# Patient Record
Sex: FEMALE | Race: WHITE | NOT HISPANIC OR LATINO | Employment: OTHER | URBAN - METROPOLITAN AREA
[De-identification: names, ages, dates, MRNs, and addresses within clinical notes are randomized per-mention and may not be internally consistent; named-entity substitution may affect disease eponyms.]

---

## 2023-01-20 ENCOUNTER — APPOINTMENT (OUTPATIENT)
Dept: MRI IMAGING | Facility: CLINIC | Age: 83
End: 2023-01-20
Attending: EMERGENCY MEDICINE
Payer: MEDICARE

## 2023-01-20 ENCOUNTER — HOSPITAL ENCOUNTER (EMERGENCY)
Facility: CLINIC | Age: 83
Discharge: HOME | End: 2023-01-20
Attending: EMERGENCY MEDICINE | Admitting: EMERGENCY MEDICINE
Payer: MEDICARE

## 2023-01-20 VITALS
RESPIRATION RATE: 20 BRPM | OXYGEN SATURATION: 96 % | HEART RATE: 68 BPM | WEIGHT: 130 LBS | TEMPERATURE: 97 F | DIASTOLIC BLOOD PRESSURE: 72 MMHG | SYSTOLIC BLOOD PRESSURE: 114 MMHG

## 2023-01-20 DIAGNOSIS — R51.9 NONINTRACTABLE EPISODIC HEADACHE, UNSPECIFIED HEADACHE TYPE: Primary | ICD-10-CM

## 2023-01-20 DIAGNOSIS — H53.8 BLURRED VISION: ICD-10-CM

## 2023-01-20 LAB
ALBUMIN SERPL BCG-MCNC: 4 G/DL (ref 3.5–5.2)
ALP SERPL-CCNC: 34 U/L (ref 35–104)
ALT SERPL W P-5'-P-CCNC: 48 U/L (ref 10–35)
ANION GAP SERPL CALCULATED.3IONS-SCNC: 8 MMOL/L (ref 7–15)
AST SERPL W P-5'-P-CCNC: 39 U/L (ref 10–35)
BASOPHILS # BLD AUTO: 0 10E3/UL (ref 0–0.2)
BASOPHILS NFR BLD AUTO: 0 %
BILIRUB SERPL-MCNC: 0.4 MG/DL
BUN SERPL-MCNC: 14.2 MG/DL (ref 8–23)
CALCIUM SERPL-MCNC: 9.3 MG/DL (ref 8.8–10.2)
CHLORIDE SERPL-SCNC: 97 MMOL/L (ref 98–107)
CREAT SERPL-MCNC: 0.92 MG/DL (ref 0.51–0.95)
DEPRECATED HCO3 PLAS-SCNC: 28 MMOL/L (ref 22–29)
EOSINOPHIL # BLD AUTO: 0.1 10E3/UL (ref 0–0.7)
EOSINOPHIL NFR BLD AUTO: 3 %
ERYTHROCYTE [DISTWIDTH] IN BLOOD BY AUTOMATED COUNT: 12.8 % (ref 10–15)
ERYTHROCYTE [SEDIMENTATION RATE] IN BLOOD BY WESTERGREN METHOD: 32 MM/HR (ref 0–30)
GFR SERPL CREATININE-BSD FRML MDRD: 62 ML/MIN/1.73M2
GLUCOSE SERPL-MCNC: 125 MG/DL (ref 70–99)
HCT VFR BLD AUTO: 42.4 % (ref 35–47)
HGB BLD-MCNC: 14.2 G/DL (ref 11.7–15.7)
IMM GRANULOCYTES # BLD: 0 10E3/UL
IMM GRANULOCYTES NFR BLD: 0 %
LYMPHOCYTES # BLD AUTO: 1.7 10E3/UL (ref 0.8–5.3)
LYMPHOCYTES NFR BLD AUTO: 37 %
MCH RBC QN AUTO: 29.9 PG (ref 26.5–33)
MCHC RBC AUTO-ENTMCNC: 33.5 G/DL (ref 31.5–36.5)
MCV RBC AUTO: 89 FL (ref 78–100)
MONOCYTES # BLD AUTO: 0.5 10E3/UL (ref 0–1.3)
MONOCYTES NFR BLD AUTO: 11 %
NEUTROPHILS # BLD AUTO: 2.3 10E3/UL (ref 1.6–8.3)
NEUTROPHILS NFR BLD AUTO: 49 %
NRBC # BLD AUTO: 0 10E3/UL
NRBC BLD AUTO-RTO: 0 /100
PLATELET # BLD AUTO: 309 10E3/UL (ref 150–450)
POTASSIUM SERPL-SCNC: 4.5 MMOL/L (ref 3.4–5.3)
PROT SERPL-MCNC: 7.2 G/DL (ref 6.4–8.3)
RBC # BLD AUTO: 4.75 10E6/UL (ref 3.8–5.2)
SODIUM SERPL-SCNC: 133 MMOL/L (ref 136–145)
WBC # BLD AUTO: 4.7 10E3/UL (ref 4–11)

## 2023-01-20 PROCEDURE — 85652 RBC SED RATE AUTOMATED: CPT | Performed by: EMERGENCY MEDICINE

## 2023-01-20 PROCEDURE — 255N000002 HC RX 255 OP 636: Performed by: EMERGENCY MEDICINE

## 2023-01-20 PROCEDURE — 99285 EMERGENCY DEPT VISIT HI MDM: CPT | Mod: 25

## 2023-01-20 PROCEDURE — 250N000011 HC RX IP 250 OP 636: Performed by: EMERGENCY MEDICINE

## 2023-01-20 PROCEDURE — A9585 GADOBUTROL INJECTION: HCPCS | Performed by: EMERGENCY MEDICINE

## 2023-01-20 PROCEDURE — 96375 TX/PRO/DX INJ NEW DRUG ADDON: CPT | Mod: 59

## 2023-01-20 PROCEDURE — 85025 COMPLETE CBC W/AUTO DIFF WBC: CPT | Performed by: EMERGENCY MEDICINE

## 2023-01-20 PROCEDURE — 36415 COLL VENOUS BLD VENIPUNCTURE: CPT | Performed by: EMERGENCY MEDICINE

## 2023-01-20 PROCEDURE — 80053 COMPREHEN METABOLIC PANEL: CPT | Performed by: EMERGENCY MEDICINE

## 2023-01-20 PROCEDURE — 96374 THER/PROPH/DIAG INJ IV PUSH: CPT | Mod: 59

## 2023-01-20 PROCEDURE — 70553 MRI BRAIN STEM W/O & W/DYE: CPT

## 2023-01-20 RX ORDER — DIPHENHYDRAMINE HYDROCHLORIDE 50 MG/ML
25 INJECTION INTRAMUSCULAR; INTRAVENOUS ONCE
Status: COMPLETED | OUTPATIENT
Start: 2023-01-20 | End: 2023-01-20

## 2023-01-20 RX ORDER — GADOBUTROL 604.72 MG/ML
6 INJECTION INTRAVENOUS ONCE
Status: COMPLETED | OUTPATIENT
Start: 2023-01-20 | End: 2023-01-20

## 2023-01-20 RX ADMIN — GADOBUTROL 6 ML: 604.72 INJECTION INTRAVENOUS at 18:55

## 2023-01-20 RX ADMIN — DIPHENHYDRAMINE HYDROCHLORIDE 25 MG: 50 INJECTION, SOLUTION INTRAMUSCULAR; INTRAVENOUS at 15:57

## 2023-01-20 RX ADMIN — PROCHLORPERAZINE EDISYLATE 5 MG: 5 INJECTION INTRAMUSCULAR; INTRAVENOUS at 15:57

## 2023-01-20 ASSESSMENT — VISUAL ACUITY
OD: 20/60
OS: 20/60

## 2023-01-20 ASSESSMENT — ENCOUNTER SYMPTOMS
HEADACHES: 1
SHORTNESS OF BREATH: 0
LIGHT-HEADEDNESS: 1

## 2023-01-20 NOTE — ED TRIAGE NOTES
Patient reports intermittent sharp headache since Wednesday.   She also reports today she had some flashing in her vision.  She currently is headache free and she does not have any vision changes.  She did not have any numbness or tingling anywhere during these episodes or now.  No arm drift.  Sensation equal.  Shes currently symptom free but does complain of feeing off.  She took a xanax earlier and since feels better.

## 2023-01-20 NOTE — ED NOTES
"Rapid Assessment Note    History:   Bailey Monroe is a 82 year old female who presents with headache and visual disturbances. The patient reports that two days ago she felt a sharp, intermittent, and strobing headache that keeps her from sleeping. She explains that today her peripheral vision was affected, and she began seeing strobing lights in both eyes that last about 20 minutes. Her vision was also blurry at this time. Bailey is currently concerned that these symptoms may be due to a stroke. She says that she took a Xanax this morning but no other medications. Of note, for the past 10 days she has exhibited the feeling of a lowered blood pressure, shakiness, and lightheadedness. Bailey suspects that she may have a sinus issue or low blood sugar, explaining that she has felt \"off\" in this time period. She has never had sharp headaches in the past.    Exam:   General:  Alert, interactive  Cardiovascular:  Well perfused  Lungs:  No respiratory distress, no accessory muscle use  Neuro:  Moving all 4 extremities  Skin:  Warm, dry  Psych:  Normal affect    Plan of Care:   I evaluated the patient and developed an initial plan of care. I discussed this plan and explained that I, or one of my partners, would be returning to complete the evaluation.         I, Jp Kirill, am serving as a scribe to document services personally performed by Bradly Freedman MD, based on my observations and the provider's statements to me.    1/20/2023  [facility]    Portions of this medical record were completed by a scribe. UPON MY REVIEW AND AUTHENTICATION BY ELECTRONIC SIGNATURE, this confirms (a) I performed the applicable clinical services, and (b) the record is accurate.        Bradly Freedman MD  01/20/23 9799    "

## 2023-01-21 NOTE — ED PROVIDER NOTES
History   Chief Complaint:  Headache     HPI   Bailey Monroe is a 82 year old female who presents with a constant, stabbing headache that started 2 days ago. It had gotten better today but while she was looking at her computer she developed bright flashes in her vision with intermittent and milder headaches. She took Aleve with mild relief. She does not have a history of headaches. She has lightheadedness and no headache currently. She does not have a history of hypertension but her blood pressure as maggie today. She denies chest pain or shortness of breath.     Independent Historian: the patient    ROS:  Review of Systems   Eyes: Positive for visual disturbance.   Respiratory: Negative for shortness of breath.    Cardiovascular: Negative for chest pain.   Neurological: Positive for light-headedness and headaches.   All other systems reviewed and are negative.    Allergies:  Bacitracin    Medications:    Albuterol  Flonase  Desyrel    Past Medical History:    Asthma  Osteoarthritis  Anxiety  Hyperlipidemia  Diverticulosis  ADD  Hypothyroidism  Depression    Past Surgical History:    Breast reconstruction  Laminectomy  Tonsillectomy   Liposuction  Breast augmentation  Mastopexy    Social History:  She presents with her son  PCP: Harriet Petersen     Physical Exam     Patient Vitals for the past 24 hrs:   BP Temp Temp src Pulse Resp SpO2 Weight   01/20/23 1600 137/79 -- -- -- -- -- 59 kg (130 lb)   01/20/23 1508 (!) 157/87 97  F (36.1  C) Temporal 104 20 96 % --      Physical Exam  General: Alert, appears well-developed and well-nourished. Cooperative.     In no distress  HEENT:  Head:  Atraumatic  Ears:  External ears are normal  Mouth/Throat:  Oropharynx is without erythema or exudate and mucous membranes are moist.   Eyes:   Conjunctivae normal and EOM are normal. No scleral icterus.    Pupils are equal, round, and reactive to light.   CV:  Normal rate, regular rhythm, normal heart sounds and radial pulses  are 2+ and symmetric.  No murmur.  Resp:  Breath sounds are clear bilaterally    Non-labored, no retractions or accessory muscle use  GI:  Abdomen is soft, no distension, no tenderness. No rebound or guarding.  No CVA tenderness bilaterally  MS:  Normal range of motion. No edema.    Normal strength in all 4 extremities.     Back atraumatic.    No midline cervical, thoracic, or lumbar tenderness  Skin:  Warm and dry.  No rash or lesions noted.  Neuro: Alert. Normal strength.  Sensation intact in all 4 extremities. GCS: 15    Cranial nerves 2-12 intact.  Psych:  Normal mood and affect.    Emergency Department Course   Imaging:  MR Brain w/o & w Contrast   Final Result   IMPRESSION:   1.  No acute intracranial process.   2.  Generalized brain atrophy and presumed microvascular ischemic changes as detailed above.      Report per radiology    Laboratory:  Labs Ordered and Resulted from Time of ED Arrival to Time of ED Departure   COMPREHENSIVE METABOLIC PANEL - Abnormal       Result Value    Sodium 133 (*)     Potassium 4.5      Chloride 97 (*)     Carbon Dioxide (CO2) 28      Anion Gap 8      Urea Nitrogen 14.2      Creatinine 0.92      Calcium 9.3      Glucose 125 (*)     Alkaline Phosphatase 34 (*)     AST 39 (*)     ALT 48 (*)     Protein Total 7.2      Albumin 4.0      Bilirubin Total 0.4      GFR Estimate 62     ERYTHROCYTE SEDIMENTATION RATE AUTO - Abnormal    Erythrocyte Sedimentation Rate 32 (*)    CBC WITH PLATELETS AND DIFFERENTIAL    WBC Count 4.7      RBC Count 4.75      Hemoglobin 14.2      Hematocrit 42.4      MCV 89      MCH 29.9      MCHC 33.5      RDW 12.8      Platelet Count 309      % Neutrophils 49      % Lymphocytes 37      % Monocytes 11      % Eosinophils 3      % Basophils 0      % Immature Granulocytes 0      NRBCs per 100 WBC 0      Absolute Neutrophils 2.3      Absolute Lymphocytes 1.7      Absolute Monocytes 0.5      Absolute Eosinophils 0.1      Absolute Basophils 0.0      Absolute Immature  Granulocytes 0.0      Absolute NRBCs 0.0        Emergency Department Course & Assessments:     2025 I obtained a history and examined the patient   2035 I explained findings and she is comfortable with discharge    Interventions:  Medications   prochlorperazine (COMPAZINE) injection 5 mg (5 mg Intravenous Given 1/20/23 1557)   diphenhydrAMINE (BENADRYL) injection 25 mg (25 mg Intravenous Given 1/20/23 1557)   gadobutrol (GADAVIST) injection 6 mL (6 mLs Intravenous Given 1/20/23 1855)      Independent Interpretation (X-rays, CTs, rhythm strip):  none     Disposition:  The patient was discharged to home.     Impression & Plan      Medical Decision Making:  Bailey Monroe is a 82 year old female who presents with an intermittent headache for three days and blurred vision. She does not have a history of headaches.  I considered a broad differential diagnosis for this patient including tension, migraine, analgesic rebound, occipital neuralgia, etc.  I also considered other less common but serious causes considered included meningitis, encephalitis, subarachnoid bleed, temporal arteritis, stroke, tumor, etc.  She has no signs of serious headache etiologies at this point.  Given ongoing blurred vision with intermittent headaches we did obtain MRI imaging of the brain which reassuringly showed no evidence of stroke, intracranial hemorrhage, intracranial tumor or masses.  Her symptoms are improved after interventions provided in the emergency department.  Lumbar puncture & additional neuroimaging is not indicated at this time is very low concern for SAH.  Her symptoms could be related to an atypical migraine although she has not been diagnosed formally with migraines historically.  I did encourage her to follow-up closely with her primary care provider and should her headaches continue in an intermittent fashion she may require referral to neurology for further outpatient work-up.  Her questions were answered and she  feels improved after above interventions in ED.  Supportive outpatient management is therefore indicated.  Headache precautions given for home.      Diagnosis:    ICD-10-CM    1. Nonintractable episodic headache, unspecified headache type  R51.9       2. Blurred vision  H53.8           Scribe Disclosure:  I, Pamela Corbett, am serving as a scribe at 8:25 PM on 1/20/2023 to document services personally performed by Everardo Cruz MD based on my observations and the provider's statements to me.    1/20/2023   Everardo Cruz MD White, Scott, MD  01/21/23 0145

## 2024-01-01 ENCOUNTER — HOSPITAL ENCOUNTER (EMERGENCY)
Facility: CLINIC | Age: 84
Discharge: HOME | End: 2024-01-02
Attending: EMERGENCY MEDICINE | Admitting: EMERGENCY MEDICINE
Payer: MEDICARE

## 2024-01-01 DIAGNOSIS — R07.9 CHEST PAIN, UNSPECIFIED TYPE: ICD-10-CM

## 2024-01-01 LAB
ANION GAP SERPL CALCULATED.3IONS-SCNC: 8 MMOL/L (ref 7–15)
ATRIAL RATE - MUSE: 72 BPM
BASOPHILS # BLD AUTO: 0 10E3/UL (ref 0–0.2)
BASOPHILS NFR BLD AUTO: 1 %
BUN SERPL-MCNC: 13.9 MG/DL (ref 8–23)
CALCIUM SERPL-MCNC: 8.4 MG/DL (ref 8.8–10.2)
CHLORIDE SERPL-SCNC: 100 MMOL/L (ref 98–107)
CREAT SERPL-MCNC: 0.81 MG/DL (ref 0.51–0.95)
D DIMER PPP FEU-MCNC: 0.85 UG/ML FEU (ref 0–0.5)
DEPRECATED HCO3 PLAS-SCNC: 27 MMOL/L (ref 22–29)
DIASTOLIC BLOOD PRESSURE - MUSE: NORMAL MMHG
EGFRCR SERPLBLD CKD-EPI 2021: 72 ML/MIN/1.73M2
EOSINOPHIL # BLD AUTO: 0.1 10E3/UL (ref 0–0.7)
EOSINOPHIL NFR BLD AUTO: 2 %
ERYTHROCYTE [DISTWIDTH] IN BLOOD BY AUTOMATED COUNT: 12.3 % (ref 10–15)
GLUCOSE SERPL-MCNC: 121 MG/DL (ref 70–99)
HCT VFR BLD AUTO: 36.6 % (ref 35–47)
HGB BLD-MCNC: 12.6 G/DL (ref 11.7–15.7)
IMM GRANULOCYTES # BLD: 0 10E3/UL
IMM GRANULOCYTES NFR BLD: 0 %
INTERPRETATION ECG - MUSE: NORMAL
LYMPHOCYTES # BLD AUTO: 1.9 10E3/UL (ref 0.8–5.3)
LYMPHOCYTES NFR BLD AUTO: 36 %
MCH RBC QN AUTO: 31 PG (ref 26.5–33)
MCHC RBC AUTO-ENTMCNC: 34.4 G/DL (ref 31.5–36.5)
MCV RBC AUTO: 90 FL (ref 78–100)
MONOCYTES # BLD AUTO: 0.5 10E3/UL (ref 0–1.3)
MONOCYTES NFR BLD AUTO: 10 %
NEUTROPHILS # BLD AUTO: 2.6 10E3/UL (ref 1.6–8.3)
NEUTROPHILS NFR BLD AUTO: 51 %
NRBC # BLD AUTO: 0 10E3/UL
NRBC BLD AUTO-RTO: 0 /100
NT-PROBNP SERPL-MCNC: 169 PG/ML (ref 0–1800)
P AXIS - MUSE: 52 DEGREES
PLATELET # BLD AUTO: 270 10E3/UL (ref 150–450)
POTASSIUM SERPL-SCNC: 3.6 MMOL/L (ref 3.4–5.3)
PR INTERVAL - MUSE: 180 MS
QRS DURATION - MUSE: 86 MS
QT - MUSE: 402 MS
QTC - MUSE: 440 MS
R AXIS - MUSE: -39 DEGREES
RBC # BLD AUTO: 4.07 10E6/UL (ref 3.8–5.2)
SODIUM SERPL-SCNC: 135 MMOL/L (ref 135–145)
SYSTOLIC BLOOD PRESSURE - MUSE: NORMAL MMHG
T AXIS - MUSE: 55 DEGREES
TROPONIN T SERPL HS-MCNC: 9 NG/L
VENTRICULAR RATE- MUSE: 72 BPM
WBC # BLD AUTO: 5.2 10E3/UL (ref 4–11)

## 2024-01-01 PROCEDURE — 99285 EMERGENCY DEPT VISIT HI MDM: CPT | Mod: 25

## 2024-01-01 PROCEDURE — 85025 COMPLETE CBC W/AUTO DIFF WBC: CPT | Performed by: EMERGENCY MEDICINE

## 2024-01-01 PROCEDURE — 36415 COLL VENOUS BLD VENIPUNCTURE: CPT | Performed by: EMERGENCY MEDICINE

## 2024-01-01 PROCEDURE — 80048 BASIC METABOLIC PNL TOTAL CA: CPT | Performed by: EMERGENCY MEDICINE

## 2024-01-01 PROCEDURE — 93005 ELECTROCARDIOGRAM TRACING: CPT

## 2024-01-01 PROCEDURE — 84484 ASSAY OF TROPONIN QUANT: CPT | Performed by: EMERGENCY MEDICINE

## 2024-01-01 PROCEDURE — 85379 FIBRIN DEGRADATION QUANT: CPT | Performed by: EMERGENCY MEDICINE

## 2024-01-01 PROCEDURE — 83880 ASSAY OF NATRIURETIC PEPTIDE: CPT | Performed by: EMERGENCY MEDICINE

## 2024-01-01 ASSESSMENT — ACTIVITIES OF DAILY LIVING (ADL): ADLS_ACUITY_SCORE: 35

## 2024-01-02 ENCOUNTER — APPOINTMENT (OUTPATIENT)
Dept: CT IMAGING | Facility: CLINIC | Age: 84
End: 2024-01-02
Attending: EMERGENCY MEDICINE
Payer: MEDICARE

## 2024-01-02 VITALS
DIASTOLIC BLOOD PRESSURE: 77 MMHG | RESPIRATION RATE: 15 BRPM | HEART RATE: 70 BPM | SYSTOLIC BLOOD PRESSURE: 114 MMHG | OXYGEN SATURATION: 94 % | TEMPERATURE: 97.8 F

## 2024-01-02 LAB — TROPONIN T SERPL HS-MCNC: <6 NG/L

## 2024-01-02 PROCEDURE — 36415 COLL VENOUS BLD VENIPUNCTURE: CPT | Performed by: EMERGENCY MEDICINE

## 2024-01-02 PROCEDURE — 71275 CT ANGIOGRAPHY CHEST: CPT | Mod: MA

## 2024-01-02 PROCEDURE — 250N000009 HC RX 250: Performed by: EMERGENCY MEDICINE

## 2024-01-02 PROCEDURE — 84484 ASSAY OF TROPONIN QUANT: CPT | Performed by: EMERGENCY MEDICINE

## 2024-01-02 PROCEDURE — 250N000011 HC RX IP 250 OP 636: Performed by: EMERGENCY MEDICINE

## 2024-01-02 RX ORDER — IOPAMIDOL 755 MG/ML
57 INJECTION, SOLUTION INTRAVASCULAR ONCE
Status: COMPLETED | OUTPATIENT
Start: 2024-01-02 | End: 2024-01-02

## 2024-01-02 RX ADMIN — IOPAMIDOL 57 ML: 755 INJECTION, SOLUTION INTRAVENOUS at 01:19

## 2024-01-02 RX ADMIN — SODIUM CHLORIDE 84 ML: 9 INJECTION, SOLUTION INTRAVENOUS at 01:19

## 2024-01-02 ASSESSMENT — ACTIVITIES OF DAILY LIVING (ADL): ADLS_ACUITY_SCORE: 35

## 2024-01-02 NOTE — ED PROVIDER NOTES
"  History     Chief Complaint:  Chest Pain    HPI   Bailey Monroe is a 83 year old female who presents with chest pain beginning around 4 hours ago. She reports that she was sitting down when she began noticing a \"stabbing\" pain in the left side that lasted around 30 seconds. Reports that the pain alleviated; when she stood up she took an Aspirin, and when she sat down again she noticed the pain returned but was less severe. Endorses lightheadedness and some nausea. Also endorses shortness of breath which she believes may be due to \"panicking\"; she notes she was \"afraid to breathe too deeply\". No leg swelling, fever, cough, or cold. No history of heart attack or blood clot. She is not anticoagulated.       Independent Historian:   None - Patient Only    Review of External Notes:          Medications:    Guaifenesin   Methylprednisolone     Past Medical History:    Anxiety  Hyperlipidemia   Stage 3a chronic kidney disease   ADD  Hypothyroidism  Obesity  Depressive disorder     Past Surgical History:    Breast reconstruction  Lumbar laminectomy  Tonsillectomy  Liposuction  Breast augmentation, bilateral  Mastopexy     Physical Exam   Patient Vitals for the past 24 hrs:   BP Temp Temp src Pulse Resp SpO2   01/02/24 0252 114/77 -- -- 70 15 94 %   01/02/24 0200 114/71 -- -- 70 14 93 %   01/02/24 0015 (!) 140/77 -- -- 64 16 93 %   01/01/24 2230 (!) 144/76 -- -- -- 16 95 %   01/01/24 2224 (!) 144/76 97.8  F (36.6  C) Oral 80 16 95 %        Physical Exam  General: Well-nourished, resting comfortably when I enter the room  Eyes: Pupils equal, conjunctivae pink no scleral icterus or conjunctival injection  ENT:  Moist mucus membranes  Respiratory:  Lungs clear to auscultation bilaterally, no crackles/rubs/wheezes.  Good air movement  CV: Normal rate and rhythm, no murmurs  GI:  Abdomen soft and non-distended.  No tenderness, guarding or rebound  Skin: Warm, dry.  No rashes or petechiae  Musculoskeletal: No peripheral " edema or calf tenderness  Neuro: Alert and oriented to person/place/time  Psychiatric: Normal affect    Emergency Department Course   ECG  ECG results from 01/01/24   EKG 12-lead, tracing only     Value    Systolic Blood Pressure     Diastolic Blood Pressure     Ventricular Rate 72    Atrial Rate 72    FL Interval 180    QRS Duration 86        QTc 440    P Axis 52    R AXIS -39    T Axis 55    Interpretation ECG      Sinus rhythm  Left axis deviation  Pulmonary disease pattern  Nonspecific T wave abnormality  Abnormal ECG  No previous ECGs available  Confirmed by GENERATED REPORT, COMPUTER ([id number]),  DAI SWANSON ([id number]) on 1/1/2024 11:14:11 PM       Imaging:  CT Chest Pulmonary Embolism w Contrast   Final Result   IMPRESSION:   1.  Negative for pulmonary embolism.      2.  No evidence of pneumonia.      3.  Mild coronary artery disease.      NM Lexiscan stress test (nuc card)    (Results Pending)          Laboratory:  Labs Ordered and Resulted from Time of ED Arrival to Time of ED Departure   BASIC METABOLIC PANEL - Abnormal       Result Value    Sodium 135      Potassium 3.6      Chloride 100      Carbon Dioxide (CO2) 27      Anion Gap 8      Urea Nitrogen 13.9      Creatinine 0.81      GFR Estimate 72      Calcium 8.4 (*)     Glucose 121 (*)    D DIMER QUANTITATIVE - Abnormal    D-Dimer Quantitative 0.85 (*)    TROPONIN T, HIGH SENSITIVITY - Normal    Troponin T, High Sensitivity 9     NT PROBNP INPATIENT - Normal    N terminal Pro BNP Inpatient 169     TROPONIN T, HIGH SENSITIVITY - Normal    Troponin T, High Sensitivity <6     CBC WITH PLATELETS AND DIFFERENTIAL    WBC Count 5.2      RBC Count 4.07      Hemoglobin 12.6      Hematocrit 36.6      MCV 90      MCH 31.0      MCHC 34.4      RDW 12.3      Platelet Count 270      % Neutrophils 51      % Lymphocytes 36      % Monocytes 10      % Eosinophils 2      % Basophils 1      % Immature Granulocytes 0      NRBCs per 100 WBC 0      Absolute Neutrophils 2.6       Absolute Lymphocytes 1.9      Absolute Monocytes 0.5      Absolute Eosinophils 0.1      Absolute Basophils 0.0      Absolute Immature Granulocytes 0.0      Absolute NRBCs 0.0          Procedures       Emergency Department Course & Assessments:       Interventions:  Medications   iopamidol (ISOVUE-370) solution 57 mL (57 mLs Intravenous $Given 24)   Saline (84 mLs Intravenous $Given 24)        Assessments:   I entered the patient's room and obtained history.   I rechecked the patient and explained findings.    Independent Interpretation (X-rays, CTs, rhythm strip):  None    Consultations/Discussion of Management or Tests:  None        Social Determinants of Health affecting care:   None    Disposition:  The patient was discharged to home.     Impression & Plan    CMS Diagnoses: None    Medical Decision Makin-year-old female presents emergency department with a complaint of chest pain.  Patient reports about 4 hours ago she had an episode of 30 seconds of sharp chest pain on the left side.  She states that it was stabbing.  She reports that it resolved.  She reports during that episode she felt a little bit lightheaded and had some nausea.  She states that she felt like she was panicking.  On exam patient's pain has resolved.  She is not in any acute distress.  She is not hypoxic, tachycardic, or tachypneic.  Lab work is reassuring.  D-dimer is elevated, even with age adjustment it is still a little bit high.  I did speak with the patient about risks and benefits of CT scan.  She does want to go ahead with the CT scan.  The CT scan does not show any PE.  The report does say mild coronary artery disease.  Patient's repeat troponin is completely negative.  No signs of heart failure.  Patient is not having any swelling in her legs.  No signs of pneumonia.  She has not had any fevers, cough, or congestion.  I will set up a stress test for the patient as well as follow-up with  cardiology.  Patient feels comfortable with this plan.  She is given strict return precautions.  Patient is discharged home.      Diagnosis:    ICD-10-CM    1. Chest pain, unspecified type  R07.9 Follow-Up with Cardiology     NM Lexiscan stress test (nuc card)           Discharge Medications:  There are no discharge medications for this patient.         Scribe Disclosure:  Leigh Ann NEGRON Hired, am serving as a scribe at 11:06 PM on 1/1/2024 to document services personally performed by Samara Manzo MD based on my observations and the provider's statements to me.   1/1/2024   Samara Manzo MD Richardson, Elizabeth, MD  01/02/24 0422

## 2024-01-02 NOTE — ED TRIAGE NOTES
Arrived via ems for c/o chest pain for 3 times that lasted 30 seconds around 7 pm per ems. Pain rated 9/10. Patient feel lightheadedness and nausea after the chest pain. Patient has h/o asthma. Aspirin 162 mg given ems.  per ems. Patient also stated she took aspirin at home. Patient is alert and oriented x4. Denies sob and chest pain right now.        Triage Assessment (Adult)       Row Name 01/01/24 9327          Triage Assessment    Airway WDL WDL        Respiratory WDL    Respiratory WDL WDL        Skin Circulation/Temperature WDL    Skin Circulation/Temperature WDL WDL        Cardiac WDL    Cardiac WDL WDL        Peripheral/Neurovascular WDL    Peripheral Neurovascular WDL WDL        Cognitive/Neuro/Behavioral WDL    Cognitive/Neuro/Behavioral WDL WDL

## 2024-01-04 ENCOUNTER — HOSPITAL ENCOUNTER (OUTPATIENT)
Dept: CARDIOLOGY | Facility: CLINIC | Age: 84
Discharge: HOME | End: 2024-01-04
Attending: EMERGENCY MEDICINE
Payer: MEDICARE

## 2024-01-04 VITALS
BODY MASS INDEX: 24.13 KG/M2 | DIASTOLIC BLOOD PRESSURE: 64 MMHG | HEIGHT: 63 IN | SYSTOLIC BLOOD PRESSURE: 102 MMHG | OXYGEN SATURATION: 97 % | WEIGHT: 136.2 LBS

## 2024-01-04 VITALS — SYSTOLIC BLOOD PRESSURE: 122 MMHG | DIASTOLIC BLOOD PRESSURE: 66 MMHG | HEART RATE: 78 BPM

## 2024-01-04 DIAGNOSIS — R07.9 CHEST PAIN, UNSPECIFIED TYPE: ICD-10-CM

## 2024-01-04 LAB
CV STRESS MAX HR HE: 118
RATE PRESSURE PRODUCT: NORMAL
STRESS ECHO BASELINE DIASTOLIC HE: 66
STRESS ECHO BASELINE HR: 76 BPM
STRESS ECHO BASELINE SYSTOLIC BP: 122
STRESS ECHO CALCULATED PERCENT HR: 86 %
STRESS ECHO LAST STRESS DIASTOLIC BP: 62
STRESS ECHO LAST STRESS SYSTOLIC BP: 120
STRESS ECHO TARGET HR: 138

## 2024-01-04 PROCEDURE — 93016 CV STRESS TEST SUPVJ ONLY: CPT | Performed by: INTERNAL MEDICINE

## 2024-01-04 PROCEDURE — 343N000001 HC RX 343: Performed by: EMERGENCY MEDICINE

## 2024-01-04 PROCEDURE — A9502 TC99M TETROFOSMIN: HCPCS | Performed by: EMERGENCY MEDICINE

## 2024-01-04 PROCEDURE — 78452 HT MUSCLE IMAGE SPECT MULT: CPT | Mod: ME

## 2024-01-04 PROCEDURE — 250N000011 HC RX IP 250 OP 636: Performed by: EMERGENCY MEDICINE

## 2024-01-04 PROCEDURE — 93018 CV STRESS TEST I&R ONLY: CPT | Performed by: INTERNAL MEDICINE

## 2024-01-04 PROCEDURE — 78452 HT MUSCLE IMAGE SPECT MULT: CPT | Mod: 26 | Performed by: INTERNAL MEDICINE

## 2024-01-04 PROCEDURE — G1010 CDSM STANSON: HCPCS | Performed by: INTERNAL MEDICINE

## 2024-01-04 RX ORDER — CAFFEINE CITRATE 20 MG/ML
60 SOLUTION INTRAVENOUS
Status: DISCONTINUED | OUTPATIENT
Start: 2024-01-04 | End: 2024-01-05 | Stop reason: HOSPADM

## 2024-01-04 RX ORDER — AMINOPHYLLINE 25 MG/ML
50-100 INJECTION, SOLUTION INTRAVENOUS
Status: DISCONTINUED | OUTPATIENT
Start: 2024-01-04 | End: 2024-01-05 | Stop reason: HOSPADM

## 2024-01-04 RX ORDER — REGADENOSON 0.08 MG/ML
0.4 INJECTION, SOLUTION INTRAVENOUS ONCE
Status: COMPLETED | OUTPATIENT
Start: 2024-01-04 | End: 2024-01-04

## 2024-01-04 RX ORDER — ALBUTEROL SULFATE 90 UG/1
2 AEROSOL, METERED RESPIRATORY (INHALATION) EVERY 5 MIN PRN
Status: DISCONTINUED | OUTPATIENT
Start: 2024-01-04 | End: 2024-01-05 | Stop reason: HOSPADM

## 2024-01-04 RX ORDER — ACYCLOVIR 200 MG/1
0-1 CAPSULE ORAL
Status: DISCONTINUED | OUTPATIENT
Start: 2024-01-04 | End: 2024-01-05 | Stop reason: HOSPADM

## 2024-01-04 RX ADMIN — TETROFOSMIN 3.4 MILLICURIE: 1.38 INJECTION, POWDER, LYOPHILIZED, FOR SOLUTION INTRAVENOUS at 12:57

## 2024-01-04 RX ADMIN — REGADENOSON 0.4 MG: 0.08 INJECTION, SOLUTION INTRAVENOUS at 14:05

## 2024-01-04 RX ADMIN — TETROFOSMIN 9.99 MILLICURIE: 1.38 INJECTION, POWDER, LYOPHILIZED, FOR SOLUTION INTRAVENOUS at 14:09

## 2024-01-14 ENCOUNTER — HEALTH MAINTENANCE LETTER (OUTPATIENT)
Age: 84
End: 2024-01-14

## 2024-03-01 ENCOUNTER — OFFICE VISIT (OUTPATIENT)
Dept: CARDIOLOGY | Facility: CLINIC | Age: 84
End: 2024-03-01
Attending: EMERGENCY MEDICINE
Payer: MEDICARE

## 2024-03-01 VITALS
OXYGEN SATURATION: 95 % | BODY MASS INDEX: 25.66 KG/M2 | WEIGHT: 135.9 LBS | DIASTOLIC BLOOD PRESSURE: 66 MMHG | SYSTOLIC BLOOD PRESSURE: 132 MMHG | HEART RATE: 79 BPM | HEIGHT: 61 IN

## 2024-03-01 DIAGNOSIS — R07.9 CHEST PAIN, UNSPECIFIED TYPE: ICD-10-CM

## 2024-03-01 DIAGNOSIS — E78.5 DYSLIPIDEMIA: ICD-10-CM

## 2024-03-01 DIAGNOSIS — R01.1 SYSTOLIC MURMUR: Primary | ICD-10-CM

## 2024-03-01 PROCEDURE — 99204 OFFICE O/P NEW MOD 45 MIN: CPT | Performed by: INTERNAL MEDICINE

## 2024-03-01 RX ORDER — ROSUVASTATIN CALCIUM 5 MG/1
5 TABLET, COATED ORAL AT BEDTIME
Qty: 90 TABLET | Refills: 3 | Status: SHIPPED | OUTPATIENT
Start: 2024-03-01 | End: 2024-06-26 | Stop reason: SINTOL

## 2024-03-01 NOTE — PATIENT INSTRUCTIONS
March 1, 2024    Thank you for allowing our Cardiology team to participate in your care.     Please note the following changes to your heart treatment plan:     Medication changes:   - start rosuvastatin 5mg at bedtime     Tests to be done:  - FASTING lipids in 3 months  - TTE (heart ultrasound)    Follow up:  - Follow up in 3 months, or sooner as needed.      For scheduling, please call [phone].      Please contact our team through [employer] or our Nurse Team Voicemail service [phone], or the General Clinic [phone] for any questions or concerns.     If you are having a medical emergency, please call 161.     Sincerely,    Johnnie Mason MD, FACC  Cardiology    [facility] - [facility] - [facility] - [place]

## 2024-03-01 NOTE — PROGRESS NOTES
Cardiology Clinic Consultation:    March 1, 2024   Patient Name: Bailey Monroe  Patient MRN: [id number]    Consult indication: dyslipidemia, chest pain    HPI:    I had the opportunity to see patient Bailey Monroe in cardiology clinic for a consultation.  Patient is followed closely by Dr. Petersen with Primary Care.    As you know patient is a pleasant 83-year-old female with a past medical history significant for dyslipidemia who presents to establish care.    At baseline patient is very physically active.  She has been active for most of her life, and continues to exercise regularly, walking at a brisk pace for about 2 miles a day.  She denies any exertional chest pain, chest pressure, abnormal shortness of breath.  She adheres to a strict diet, avoids red meat, incorporates vegetables, lean protein, fiber.  She does not smoke cigarettes.    She was in her usual state of health until 1/1/2024, she felt acute onset left-sided chest pain lasting for about 30 seconds while she was in bed.  She is seen in the ED, ECG did not demonstrate any clear evidence of ischemia.  CT PE study was negative for PE.  On personal review, I concur there are some mild coronary artery calcifications.  High-sensitivity troponin was normal x 2.  She was assessed with a Lexiscan stress test 1/4/2024.  Study reviewed personally, I concur with the reported findings, negative for inducible myocardial ischemia or infarction.  No recurrence of chest pain since then.    Recent labs notable for total cholesterol 283, triglycerides 240, HDL 61, .  Patient did have some half-and-half with coffee prior to lab draw.    I also cared for her son who has since moved to [place], and I am glad to hear he is doing well.    Assessment and Plan/Recommendations:    # Chest pain, non-cardiac, resolved  # Dyslipidemia, mild CAC on CT PE study  # Soft systolic murmur    -Overall patient is in excellent cardiac health without symptoms  concerning for angina or decompensated heart failure.  She is very physically active, exercising regularly, which is very commendable.  She is also following a generally Mediterranean type diet already.  Encouraged continued healthy lifestyle habits including regular aerobic exercise, heart healthy Mediterranean type diet incorporating olive oil and fiber, lean protein, vegetables  - Reviewed prior cardiac diagnostic studies in detail, discussed options for further evaluation and management  - Discussed risk/benefits, will start low-dose rosuvastatin 5 mg nightly, fasting lipids and ALT in 3 months  - Suspect murmur is benign, likely related to mild dehydration, patient does endorse that she likely does not consume enough fluids throughout the day.  Will assess with a TTE  - Follow-up in 3 months, or sooner as needed    Thank you for allowing our team to participate in the care of Bailey Monroe.  Please do not hesitate to call or page me with any questions or concerns.    Sincerely,     Johnnie Mason MD, [facility]  Cardiology  March 1, 2024      Samara Manzo MD  [facility]  [address]  [address]  [place] [id number]      Past Medical History:     Dyslipidemia      Past Surgical History:   History reviewed. No pertinent surgical history.    Medications (outpatient):  No current outpatient medications on file.       Allergies:  Allergies   Allergen Reactions    Bacitracin Rash    Trazodone Other (See Comments)     Paradoxial reaction, makes her jittery, awake       Social History:   History   Drug Use Not on file      History   Smoking Status    Never   Smokeless Tobacco    Never     Social History    Substance and Sexual Activity      Alcohol use: Yes        Comment: rare       Family History:  Family History   Problem Relation Age of Onset    Anxiety Disorder Mother     Hyperlipidemia Mother     Hypertension Mother     Myocardial Infarction Mother     Goiter Mother   "   Diabetes Paternal Grandmother     Cerebrovascular Disease Paternal Grandfather     Diabetes Brother     Septicemia Brother     Factor V Leiden deficiency Brother        Review of Systems:   A comprehensive 12 system review of systems was carried out.  Pertinent positives and negatives are noted above. Otherwise negative for contributory information.    Objective & Physical Exam:  /66   Pulse 79   Ht 1.556 m (5' 1.25\")   Wt 61.6 kg (135 lb 14.4 oz)   SpO2 95%   BMI 25.47 kg/m    Wt Readings from Last 2 Encounters:   03/01/24 61.6 kg (135 lb 14.4 oz)   01/04/24 61.8 kg (136 lb 3.2 oz)     Body mass index is 25.47 kg/m .   Body surface area is 1.63 meters squared.    Constitutional: appears stated age, in no apparent distress, appears to be well nourished  Pulmonary: clear to auscultation bilaterally, no wheezes, no rales, no increased work of breathing  Cardiovascular: JVP normal, regular rate, regular rhythm, normal S1 and S2, no S3, S4, soft 1/6 BRITTNY at the RUSB, no lower extremity edema    Data reviewed:  Lab Results   Component Value Date    WBC 5.2 01/01/2024    RBC 4.07 01/01/2024    HGB 12.6 01/01/2024    HCT 36.6 01/01/2024    MCV 90 01/01/2024    MCH 31.0 01/01/2024    MCHC 34.4 01/01/2024    RDW 12.3 01/01/2024     01/01/2024     Sodium   Date Value Ref Range Status   01/01/2024 135 135 - 145 mmol/L Final     Comment:     Reference intervals for this test were updated on 09/26/2023 to more accurately reflect our healthy population. There may be differences in the flagging of prior results with similar values performed with this method. Interpretation of those prior results can be made in the context of the updated reference intervals.      Potassium   Date Value Ref Range Status   01/01/2024 3.6 3.4 - 5.3 mmol/L Final     Chloride   Date Value Ref Range Status   01/01/2024 100 98 - 107 mmol/L Final     Carbon Dioxide (CO2)   Date Value Ref Range Status   01/01/2024 27 22 - 29 mmol/L Final " "    Anion Gap   Date Value Ref Range Status   01/01/2024 8 7 - 15 mmol/L Final     Glucose   Date Value Ref Range Status   01/01/2024 121 (H) 70 - 99 mg/dL Final     Urea Nitrogen   Date Value Ref Range Status   01/01/2024 13.9 8.0 - 23.0 mg/dL Final     Creatinine   Date Value Ref Range Status   01/01/2024 0.81 0.51 - 0.95 mg/dL Final     GFR Estimate   Date Value Ref Range Status   01/01/2024 72 >60 mL/min/1.73m2 Final     Calcium   Date Value Ref Range Status   01/01/2024 8.4 (L) 8.8 - 10.2 mg/dL Final     Bilirubin Total   Date Value Ref Range Status   01/20/2023 0.4 <=1.2 mg/dL Final     Alkaline Phosphatase   Date Value Ref Range Status   01/20/2023 34 (L) 35 - 104 U/L Final     ALT   Date Value Ref Range Status   01/20/2023 48 (H) 10 - 35 U/L Final     AST   Date Value Ref Range Status   01/20/2023 39 (H) 10 - 35 U/L Final     No results for input(s): \"CHOL\", \"HDL\", \"LDL\", \"TRIG\", \"CHOLHDLRATIO\" in the last 87962 hours.   No results found for: \"A1C\"     No results found for this or any previous visit (from the past 4320 hour(s)).     "

## 2024-03-01 NOTE — LETTER
3/1/2024    KELSIE PEREZ MD  [address] [place] [address]    RE: Bailey Monroe       Dear Colleague,     I had the pleasure of seeing Bailey Monroe in the [facility].      Cardiology Clinic Consultation:    March 1, 2024   Patient Name: Bailey Monroe  Patient MRN: [id number]    Consult indication: dyslipidemia, chest pain    HPI:    I had the opportunity to see patient Bailey Monroe in cardiology clinic for a consultation.  Patient is followed closely by Dr. Perez with Primary Care.    As you know patient is a pleasant 83-year-old female with a past medical history significant for dyslipidemia who presents to establish care.    At baseline patient is very physically active.  She has been active for most of her life, and continues to exercise regularly, walking at a brisk pace for about 2 miles a day.  She denies any exertional chest pain, chest pressure, abnormal shortness of breath.  She adheres to a strict diet, avoids red meat, incorporates vegetables, lean protein, fiber.  She does not smoke cigarettes.    She was in her usual state of health until 1/1/2024, she felt acute onset left-sided chest pain lasting for about 30 seconds while she was in bed.  She is seen in the ED, ECG did not demonstrate any clear evidence of ischemia.  CT PE study was negative for PE.  On personal review, I concur there are some mild coronary artery calcifications.  High-sensitivity troponin was normal x 2.  She was assessed with a Lexiscan stress test 1/4/2024.  Study reviewed personally, I concur with the reported findings, negative for inducible myocardial ischemia or infarction.  No recurrence of chest pain since then.    Recent labs notable for total cholesterol 283, triglycerides 240, HDL 61, .  Patient did have some half-and-half with coffee prior to lab draw.    I also cared for her son who has since moved to [place], and I am glad to hear he is doing  well.    Assessment and Plan/Recommendations:    # Chest pain, non-cardiac, resolved  # Dyslipidemia, mild CAC on CT PE study  # Soft systolic murmur    -Overall patient is in excellent cardiac health without symptoms concerning for angina or decompensated heart failure.  She is very physically active, exercising regularly, which is very commendable.  She is also following a generally Mediterranean type diet already.  Encouraged continued healthy lifestyle habits including regular aerobic exercise, heart healthy Mediterranean type diet incorporating olive oil and fiber, lean protein, vegetables  - Reviewed prior cardiac diagnostic studies in detail, discussed options for further evaluation and management  - Discussed risk/benefits, will start low-dose rosuvastatin 5 mg nightly, fasting lipids and ALT in 3 months  - Suspect murmur is benign, likely related to mild dehydration, patient does endorse that she likely does not consume enough fluids throughout the day.  Will assess with a TTE  - Follow-up in 3 months, or sooner as needed    Thank you for allowing our team to participate in the care of Bailey Monroe.  Please do not hesitate to call or page me with any questions or concerns.    Sincerely,     Johnnie Mason MD, [facility]  Cardiology  March 1, 2024      Samara Manzo MD  [facility]  [address]  [address]  [place] [address]      Past Medical History:     Dyslipidemia      Past Surgical History:   History reviewed. No pertinent surgical history.    Medications (outpatient):  No current outpatient medications on file.       Allergies:  Allergies   Allergen Reactions    Bacitracin Rash    Trazodone Other (See Comments)     Paradoxial reaction, makes her jittery, awake       Social History:   History   Drug Use Not on file      History   Smoking Status    Never   Smokeless Tobacco    Never     Social History    Substance and Sexual Activity      Alcohol use: Yes      "   Comment: rare       Family History:  Family History   Problem Relation Age of Onset    Anxiety Disorder Mother     Hyperlipidemia Mother     Hypertension Mother     Myocardial Infarction Mother     Goiter Mother     Diabetes Paternal Grandmother     Cerebrovascular Disease Paternal Grandfather     Diabetes Brother     Septicemia Brother     Factor V Leiden deficiency Brother        Review of Systems:   A comprehensive 12 system review of systems was carried out.  Pertinent positives and negatives are noted above. Otherwise negative for contributory information.    Objective & Physical Exam:  /66   Pulse 79   Ht 1.556 m (5' 1.25\")   Wt 61.6 kg (135 lb 14.4 oz)   SpO2 95%   BMI 25.47 kg/m    Wt Readings from Last 2 Encounters:   03/01/24 61.6 kg (135 lb 14.4 oz)   01/04/24 61.8 kg (136 lb 3.2 oz)     Body mass index is 25.47 kg/m .   Body surface area is 1.63 meters squared.    Constitutional: appears stated age, in no apparent distress, appears to be well nourished  Pulmonary: clear to auscultation bilaterally, no wheezes, no rales, no increased work of breathing  Cardiovascular: JVP normal, regular rate, regular rhythm, normal S1 and S2, no S3, S4, soft 1/6 BRITTNY at the RUSB, no lower extremity edema    Data reviewed:  Lab Results   Component Value Date    WBC 5.2 01/01/2024    RBC 4.07 01/01/2024    HGB 12.6 01/01/2024    HCT 36.6 01/01/2024    MCV 90 01/01/2024    MCH 31.0 01/01/2024    MCHC 34.4 01/01/2024    RDW 12.3 01/01/2024     01/01/2024     Sodium   Date Value Ref Range Status   01/01/2024 135 135 - 145 mmol/L Final     Comment:     Reference intervals for this test were updated on 09/26/2023 to more accurately reflect our healthy population. There may be differences in the flagging of prior results with similar values performed with this method. Interpretation of those prior results can be made in the context of the updated reference intervals.      Potassium   Date Value Ref Range " "Status   01/01/2024 3.6 3.4 - 5.3 mmol/L Final     Chloride   Date Value Ref Range Status   01/01/2024 100 98 - 107 mmol/L Final     Carbon Dioxide (CO2)   Date Value Ref Range Status   01/01/2024 27 22 - 29 mmol/L Final     Anion Gap   Date Value Ref Range Status   01/01/2024 8 7 - 15 mmol/L Final     Glucose   Date Value Ref Range Status   01/01/2024 121 (H) 70 - 99 mg/dL Final     Urea Nitrogen   Date Value Ref Range Status   01/01/2024 13.9 8.0 - 23.0 mg/dL Final     Creatinine   Date Value Ref Range Status   01/01/2024 0.81 0.51 - 0.95 mg/dL Final     GFR Estimate   Date Value Ref Range Status   01/01/2024 72 >60 mL/min/1.73m2 Final     Calcium   Date Value Ref Range Status   01/01/2024 8.4 (L) 8.8 - 10.2 mg/dL Final     Bilirubin Total   Date Value Ref Range Status   01/20/2023 0.4 <=1.2 mg/dL Final     Alkaline Phosphatase   Date Value Ref Range Status   01/20/2023 34 (L) 35 - 104 U/L Final     ALT   Date Value Ref Range Status   01/20/2023 48 (H) 10 - 35 U/L Final     AST   Date Value Ref Range Status   01/20/2023 39 (H) 10 - 35 U/L Final     No results for input(s): \"CHOL\", \"HDL\", \"LDL\", \"TRIG\", \"CHOLHDLRATIO\" in the last 44199 hours.   No results found for: \"A1C\"     No results found for this or any previous visit (from the past 4320 hour(s)).       Thank you for allowing me to participate in the care of your patient.      Sincerely,     Johnnie Mason MD     [facility]  cc:   Samara Manzo MD  [facility]  [address]  [place] [address]      "

## 2024-04-04 ENCOUNTER — APPOINTMENT (OUTPATIENT)
Dept: CT IMAGING | Facility: CLINIC | Age: 84
End: 2024-04-04
Attending: STUDENT IN AN ORGANIZED HEALTH CARE EDUCATION/TRAINING PROGRAM
Payer: MEDICARE

## 2024-04-04 ENCOUNTER — HOSPITAL ENCOUNTER (EMERGENCY)
Facility: CLINIC | Age: 84
Discharge: HOME | End: 2024-04-04
Attending: STUDENT IN AN ORGANIZED HEALTH CARE EDUCATION/TRAINING PROGRAM | Admitting: STUDENT IN AN ORGANIZED HEALTH CARE EDUCATION/TRAINING PROGRAM
Payer: MEDICARE

## 2024-04-04 VITALS
RESPIRATION RATE: 16 BRPM | DIASTOLIC BLOOD PRESSURE: 69 MMHG | OXYGEN SATURATION: 97 % | BODY MASS INDEX: 24.66 KG/M2 | TEMPERATURE: 98.4 F | HEIGHT: 62 IN | HEART RATE: 67 BPM | WEIGHT: 134 LBS | SYSTOLIC BLOOD PRESSURE: 130 MMHG
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
## 2024-04-04 DIAGNOSIS — R55 SYNCOPE AND COLLAPSE: Primary | ICD-10-CM

## 2024-04-04 DIAGNOSIS — S09.90XA INJURY OF HEAD, INITIAL ENCOUNTER: ICD-10-CM

## 2024-04-04 DIAGNOSIS — S00.03XA CONTUSION OF SCALP, INITIAL ENCOUNTER: ICD-10-CM
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
## 2024-04-04 LAB
ALBUMIN SERPL BCG-MCNC: 3.9 G/DL (ref 3.5–5.2)
ALP SERPL-CCNC: 32 U/L (ref 40–150)
ALT SERPL W P-5'-P-CCNC: 40 U/L (ref 0–50)
ANION GAP SERPL CALCULATED.3IONS-SCNC: 13 MMOL/L (ref 7–15)
AST SERPL W P-5'-P-CCNC: 48 U/L (ref 0–45)
ATRIAL RATE - MUSE: 74 BPM
BASOPHILS # BLD AUTO: 0 10E3/UL (ref 0–0.2)
BASOPHILS NFR BLD AUTO: 1 %
BILIRUB SERPL-MCNC: 0.3 MG/DL
BUN SERPL-MCNC: 15.6 MG/DL (ref 8–23)
CALCIUM SERPL-MCNC: 8.9 MG/DL (ref 8.8–10.2)
CHLORIDE SERPL-SCNC: 101 MMOL/L (ref 98–107)
CREAT SERPL-MCNC: 0.83 MG/DL (ref 0.51–0.95)
DEPRECATED HCO3 PLAS-SCNC: 24 MMOL/L (ref 22–29)
DIASTOLIC BLOOD PRESSURE - MUSE: NORMAL MMHG
EGFRCR SERPLBLD CKD-EPI 2021: 70 ML/MIN/1.73M2
EOSINOPHIL # BLD AUTO: 0.1 10E3/UL (ref 0–0.7)
EOSINOPHIL NFR BLD AUTO: 1 %
ERYTHROCYTE [DISTWIDTH] IN BLOOD BY AUTOMATED COUNT: 13.2 % (ref 10–15)
GLUCOSE BLDC GLUCOMTR-MCNC: 139 MG/DL (ref 70–99)
GLUCOSE SERPL-MCNC: 146 MG/DL (ref 70–99)
HCT VFR BLD AUTO: 37.9 % (ref 35–47)
HGB BLD-MCNC: 12.8 G/DL (ref 11.7–15.7)
HOLD SPECIMEN: NORMAL
IMM GRANULOCYTES # BLD: 0 10E3/UL
IMM GRANULOCYTES NFR BLD: 0 %
INTERPRETATION ECG - MUSE: NORMAL
LYMPHOCYTES # BLD AUTO: 1.1 10E3/UL (ref 0.8–5.3)
LYMPHOCYTES NFR BLD AUTO: 16 %
MCH RBC QN AUTO: 29.9 PG (ref 26.5–33)
MCHC RBC AUTO-ENTMCNC: 33.8 G/DL (ref 31.5–36.5)
MCV RBC AUTO: 89 FL (ref 78–100)
MONOCYTES # BLD AUTO: 0.7 10E3/UL (ref 0–1.3)
MONOCYTES NFR BLD AUTO: 9 %
NEUTROPHILS # BLD AUTO: 5.1 10E3/UL (ref 1.6–8.3)
NEUTROPHILS NFR BLD AUTO: 73 %
NRBC # BLD AUTO: 0 10E3/UL
NRBC BLD AUTO-RTO: 0 /100
P AXIS - MUSE: 48 DEGREES
PLATELET # BLD AUTO: 266 10E3/UL (ref 150–450)
POTASSIUM SERPL-SCNC: 4.6 MMOL/L (ref 3.4–5.3)
PR INTERVAL - MUSE: 176 MS
PROT SERPL-MCNC: 6.4 G/DL (ref 6.4–8.3)
QRS DURATION - MUSE: 76 MS
QT - MUSE: 384 MS
QTC - MUSE: 426 MS
R AXIS - MUSE: -47 DEGREES
RBC # BLD AUTO: 4.28 10E6/UL (ref 3.8–5.2)
SODIUM SERPL-SCNC: 138 MMOL/L (ref 135–145)
SYSTOLIC BLOOD PRESSURE - MUSE: NORMAL MMHG
T AXIS - MUSE: 55 DEGREES
VENTRICULAR RATE- MUSE: 74 BPM
WBC # BLD AUTO: 7 10E3/UL (ref 4–11)

## 2024-04-04 PROCEDURE — 93005 ELECTROCARDIOGRAM TRACING: CPT

## 2024-04-04 PROCEDURE — 82962 GLUCOSE BLOOD TEST: CPT

## 2024-04-04 PROCEDURE — 72125 CT NECK SPINE W/O DYE: CPT | Mod: MA

## 2024-04-04 PROCEDURE — 36415 COLL VENOUS BLD VENIPUNCTURE: CPT | Performed by: STUDENT IN AN ORGANIZED HEALTH CARE EDUCATION/TRAINING PROGRAM

## 2024-04-04 PROCEDURE — 85025 COMPLETE CBC W/AUTO DIFF WBC: CPT | Performed by: STUDENT IN AN ORGANIZED HEALTH CARE EDUCATION/TRAINING PROGRAM

## 2024-04-04 PROCEDURE — 80053 COMPREHEN METABOLIC PANEL: CPT | Performed by: STUDENT IN AN ORGANIZED HEALTH CARE EDUCATION/TRAINING PROGRAM

## 2024-04-04 PROCEDURE — 70450 CT HEAD/BRAIN W/O DYE: CPT | Mod: MA

## 2024-04-04 PROCEDURE — 99285 EMERGENCY DEPT VISIT HI MDM: CPT | Mod: 25

## 2024-04-04 PROCEDURE — 250N000013 HC RX MED GY IP 250 OP 250 PS 637: Performed by: STUDENT IN AN ORGANIZED HEALTH CARE EDUCATION/TRAINING PROGRAM

## 2024-04-04 RX ORDER — ACETAMINOPHEN 500 MG
1000 TABLET ORAL ONCE
Status: COMPLETED | OUTPATIENT
Start: 2024-04-04 | End: 2024-04-04

## 2024-04-04 RX ADMIN — ACETAMINOPHEN 1000 MG: 500 TABLET, FILM COATED ORAL at 15:50

## 2024-04-04 ASSESSMENT — ACTIVITIES OF DAILY LIVING (ADL)
ADLS_ACUITY_SCORE: 35
ADLS_ACUITY_SCORE: 35

## 2024-04-04 NOTE — ED NOTES
Bed: ED09  Expected date:   Expected time:   Means of arrival:   Comments:  Beverly 1 83 F fall no thinners eta 5502

## 2024-04-04 NOTE — DISCHARGE INSTRUCTIONS
Thank you for allowing us to evaluate you today.  Follow up with primary care clinician  in 1 week for reevaluation..  Please read the guidance provided with your discharge instructions.  Immediately return to the emergency department with any concerns.

## 2024-04-04 NOTE — ED TRIAGE NOTES
Pt BIB EMS reported fainting after using the bathroom, pt stood up and was able to get herself to bed where she took a nap. Pt called 911 and was able to meet EMS in the lobby of her apartment.     Contusion noted to left forehead. Pt does not take blood thinners.     HR 98.

## 2024-04-04 NOTE — ED PROVIDER NOTES
"History   Chief Complaint:  Fall and Head Injury       HPI:  Bailey Monroe is a very pleasant 83 year old female with stage 3a CKD, hyperlipidemia, and PVCs presenting with fall and head injury. Patient presents after a fall from standing height in her bathroom earlier this morning. She states that she was making coffee when she felt like she was going to faint. She went to the bathroom when she became more lightheaded and woke up on the bathroom floor. She reports she had the urge to use the bathroom and is fairly certain she was able to before losing consciousness. She crawled into the bathroom and states her head was throbbing then she passed out. States that she woke up an hour later. She did not eat or drink anything prior to this today.  She has a contusion to the left forehead and some pain in that area.  She has some right-sided neck pain as well.  She denies chest pain, palpitations or shortness of breath prior to the incident.  She does not take anticoagulation. 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 She reports a similar episode about 15 years ago post-surgery when she had syncope and states she was told she has \"a low tolerance to anesthesia.\" She has been taking Percocet post-surgery but did not take it this morning. Reports her last dose was around 2300 last night.             Independent Historian:  None. Only the patient provided history.    Review of External Notes:  I personally reviewed notes from the patient's progress note dated  3/21/2024 . This provided me with information regarding patient's baseline medical problems.     I personally reviewed the patient's chart, including available medication list and available past medical history, past surgical history, family history, and social history.    Physical Exam   Patient Vitals for the past 24 hrs:   BP Temp Temp src Pulse Resp SpO2 Height Weight   04/04/24 1600 130/69 -- -- 67 16 97 % -- --   04/04/24 1438 (!) " "148/93 98.4  F (36.9  C) Oral 88 16 97 % 1.575 m (5' 2\") 60.8 kg (134 lb)      Physical Exam  Vitals and nursing note reviewed.   Constitutional:       Appearance: She is not ill-appearing.   HENT:      Head: Contusion (Left forehead) present. No raccoon eyes, Graham's sign, right periorbital erythema, left periorbital erythema or laceration.      Jaw: There is normal jaw occlusion.      Nose: Nose normal.   Eyes:      Extraocular Movements: Extraocular movements intact.   Pulmonary:      Effort: Pulmonary effort is normal.      Breath sounds: Normal breath sounds.   Musculoskeletal:         General: No deformity or signs of injury. Normal range of motion.      Cervical back: Normal range of motion. No tenderness.   Skin:     General: Skin is warm and dry.   Neurological:      Mental Status: She is alert and oriented to person, place, and time.      Sensory: No sensory deficit.      Motor: No weakness.             Emergency Department Course     ECG:   ECG results from 04/04/24   EKG 12-lead, tracing only     Value    Systolic Blood Pressure     Diastolic Blood Pressure     Ventricular Rate 74    Atrial Rate 74    MT Interval 176    QRS Duration 76        QTc 426    P Axis 48    R AXIS -47    T Axis 55    Interpretation ECG      Sinus rhythm with Premature atrial complexes with Aberrant conduction  Left axis deviation  Abnormal ECG  When compared with ECG of 01-JAN-2024 23:05,  PACs now   Taken at 1449. Read at 1458 by Markell Zhong MD.        My interpretation     Imaging:   CT Cervical Spine w/o Contrast   Final Result   IMPRESSION: No evidence of acute fracture or subluxation in the   cervical spine.         ENE MONTE MD            SYSTEM ID:  [id number]      CT Head w/o Contrast   Final Result   IMPRESSION:   No evidence of acute intracranial hemorrhage, mass, or   herniation.         ENE MONTE MD            SYSTEM ID:  [id number]                  Laboratory:   Labs Ordered and Resulted from " Time of ED Arrival to Time of ED Departure   COMPREHENSIVE METABOLIC PANEL - Abnormal       Result Value    Sodium 138      Potassium 4.6      Carbon Dioxide (CO2) 24      Anion Gap 13      Urea Nitrogen 15.6      Creatinine 0.83      GFR Estimate 70      Calcium 8.9      Chloride 101      Glucose 146 (*)     Alkaline Phosphatase 32 (*)     AST 48 (*)     ALT 40      Protein Total 6.4      Albumin 3.9      Bilirubin Total 0.3     GLUCOSE BY METER - Abnormal    GLUCOSE BY METER POCT 139 (*)    CBC WITH PLATELETS AND DIFFERENTIAL    WBC Count 7.0      RBC Count 4.28      Hemoglobin 12.8      Hematocrit 37.9      MCV 89      MCH 29.9      MCHC 33.8      RDW 13.2      Platelet Count 266      % Neutrophils 73      % Lymphocytes 16      % Monocytes 9      % Eosinophils 1      % Basophils 1      % Immature Granulocytes 0      NRBCs per 100 WBC 0      Absolute Neutrophils 5.1      Absolute Lymphocytes 1.1      Absolute Monocytes 0.7      Absolute Eosinophils 0.1      Absolute Basophils 0.0      Absolute Immature Granulocytes 0.0      Absolute NRBCs 0.0          Procedures  None performed    Interventions & Assessments:  Interventions:  Medications   acetaminophen (TYLENOL) tablet 1,000 mg (1,000 mg Oral $Given 4/4/24 1550)        Assessments:  Consultations/Discussion of Management or Tests:  Independent Interpretation (X-rays, CT Head, rhythm strip):  ED Course as of 04/04/24 2036   Thu Apr 04, 2024   1444 I obtained patient history and performed a physical exam.    1542 CT Head w/o Contrast  I independently interpreted the patient's non-contrast head CT; reassuring against acute intracranial hemorrhage.         Social Determinants of Health affecting care:   None.      Disposition:  The patient was discharged to home.     Impression & Plan          MIPS (If applicable):  N/A    Medical Decision Making:  Patient presenting with syncope and fall.  Vital signs are reassuring upon arrival.  Physical exam only notable for  forehead contusion.  With patient's fall, hitting head, and with some neck pain, despite absence of spinous process tenderness, did obtain head CT and CT of cervical spine to evaluate for intracranial hemorrhage, skull fracture, cervical spine fracture or subluxation.  This was reassuring.  For patient's syncope, overall impression is likely vasovagal episode given patient was feeling faint prior to going to the bathroom and then syncopized likely after using the toilet.  Feel alternative etiologies such as cardiogenic syncope, cardiac arrhythmia, stroke or seizure are significantly less likely.  Patient is neurologically intact.  She had no prodromal symptoms suggestive of another etiology.  Did obtain labs and EKG headache, which were reassuring, with no anemia, no ESTHELA, no electrolyte disturbance, no cardiac arrhythmia.  Patient was observed in the emergency department for more than 2 hours without any recurrence in symptoms.  She felt comfortable being discharged.  I feel this is appropriate.  Recommended outpatient follow-up for reevaluation as needed. Findings were discussed.  Additional verbal instructions were provided.  I discussed specific warning signs and instructed the patient to return to the emergency department if there are any concerns. Understanding of instructions was voiced, questions were answered and the patient was discharged.        Diagnosis:    ICD-10-CM    1. Syncope and collapse  R55       2. Fall at home, initial encounter  W19.XXXA     Y92.009       3. Injury of head, initial encounter  S09.90XA       4. Contusion of scalp, initial encounter  S00.03XA            Discharge Medications:  Discharge Medication List as of 4/4/2024  4:51 PM          Laura Conner  4/4/2024      Markell Zhong MD  04/04/24 2038

## 2024-05-16 ENCOUNTER — HOSPITAL ENCOUNTER (OUTPATIENT)
Dept: CARDIOLOGY | Facility: CLINIC | Age: 84
Discharge: HOME | End: 2024-05-16
Attending: INTERNAL MEDICINE | Admitting: INTERNAL MEDICINE
Payer: MEDICARE

## 2024-05-16 DIAGNOSIS — R01.1 SYSTOLIC MURMUR: ICD-10-CM

## 2024-05-16 LAB — LVEF ECHO: NORMAL

## 2024-05-16 PROCEDURE — 93306 TTE W/DOPPLER COMPLETE: CPT

## 2024-05-16 PROCEDURE — 93306 TTE W/DOPPLER COMPLETE: CPT | Mod: 26 | Performed by: INTERNAL MEDICINE

## 2024-05-19 NOTE — RESULT ENCOUNTER NOTE
Results reviewed, please let the patient know that overall findings are reassuring, normal cardiac structure and function, no significant valvular abnormalities.   Follow up as previously planned, thanks!

## 2024-06-12 ENCOUNTER — LAB (OUTPATIENT)
Dept: LAB | Facility: CLINIC | Age: 84
End: 2024-06-12
Payer: MEDICARE

## 2024-06-12 DIAGNOSIS — E78.5 DYSLIPIDEMIA: ICD-10-CM

## 2024-06-12 LAB
ALT SERPL W P-5'-P-CCNC: 16 U/L (ref 0–50)
CHOLEST SERPL-MCNC: 227 MG/DL
FASTING STATUS PATIENT QL REPORTED: YES
HDLC SERPL-MCNC: 71 MG/DL
LDLC SERPL CALC-MCNC: 133 MG/DL
NONHDLC SERPL-MCNC: 156 MG/DL
TRIGL SERPL-MCNC: 116 MG/DL

## 2024-06-12 PROCEDURE — 84460 ALANINE AMINO (ALT) (SGPT): CPT | Performed by: INTERNAL MEDICINE

## 2024-06-12 PROCEDURE — 36415 COLL VENOUS BLD VENIPUNCTURE: CPT | Performed by: INTERNAL MEDICINE

## 2024-06-12 PROCEDURE — 80061 LIPID PANEL: CPT | Performed by: INTERNAL MEDICINE

## 2024-06-14 ENCOUNTER — OFFICE VISIT (OUTPATIENT)
Dept: CARDIOLOGY | Facility: CLINIC | Age: 84
End: 2024-06-14
Attending: INTERNAL MEDICINE
Payer: MEDICARE

## 2024-06-14 VITALS
OXYGEN SATURATION: 98 % | HEART RATE: 65 BPM | WEIGHT: 137.3 LBS | HEIGHT: 61 IN | SYSTOLIC BLOOD PRESSURE: 119 MMHG | DIASTOLIC BLOOD PRESSURE: 75 MMHG | BODY MASS INDEX: 25.92 KG/M2

## 2024-06-14 DIAGNOSIS — E78.5 DYSLIPIDEMIA: ICD-10-CM

## 2024-06-14 PROCEDURE — 99213 OFFICE O/P EST LOW 20 MIN: CPT | Performed by: INTERNAL MEDICINE

## 2024-06-14 NOTE — PROGRESS NOTES
Cardiology Clinic Progress Note:    June 14, 2024   Patient Name: Bailey Monroe  Patient MRN: [id number]     Consult indication: dyslipidemia    HPI:    I had the opportunity to see patient Bailey Monroe in cardiology clinic for a follow up visit.     As you know patient is a pleasant 83-year-old female with a past medical history significant for coronary artery calcifications, dyslipidemia, who presents for follow-up.    I had met patient in clinic for a consultation 3/1/2024.  At that time we reviewed her history of dyslipidemia, as well as coronary artery calcifications on a prior CT PE study.  We recommended starting rosuvastatin 5 mg daily.  Patient has held off on this in favor of monitoring lipids with lifestyle modification, follow-up lipids from a couple of days ago total cholesterol 227, HDL 71, , triglycerides 116.  I did appreciate a soft systolic murmur on exam, so patient was assessed with an echocardiogram 5/16/2024 that demonstrated normal cardiac structure and function, mild aortic valve calcification with no evidence of stenosis.  Patient continues to do well, she continues to be very active, denies any chest pain, chest pressure, abnormal shortness of breath.  Unfortunately she did have a fall couple of months ago, she went to the bathroom and used the toilet, and shortly afterwards lost consciousness striking her head.  Fortunately workup in the ED was reassuring with CT head and neck imaging demonstrating no acute abnormalities.  On inquiry, she does note that she likely does not consume enough fluids throughout the day.  She denies any preceding palpitations.  ECG from 4/2024 normal sinus rhythm with occasional PACs.  No evidence of preexcitation.  QTc 426 ms.    Assessment and Plan/Recommendations:    # Dyslipidemia, mild CAC on CT PE study   # Syncope, I concur with ED assessment seems most consistent with vasovagal syncope, likely exacerbated by baseline inadequate fluid  intake    - Revisited statin therapy, will plan to start rosuvastatin 5 mg nightly with fasting lipids and ALT in 3 months  - Encouraged increased fluid intake throughout the day  - Follow-up in cardiology clinic as needed    Thank you for allowing our team to participate in the care of Bailey Monroe.  Please do not hesitate to call or page me with any questions or concerns.    Sincerely,     Johnnie Mason MD, [facility]  Cardiology  Text Page   June 14, 2024    cc  Johnnie Mason MD  [address], [address]  [place] [address]    Voice recognition software utilized.     Total time spent on this encounter today: Greater than 20 minutes, providing care in this encounter including, but not limited to, reviewing prior medical records, laboratory data, imaging studies, diagnostic studies, procedure notes, formulating an assessment and plan, recommendations, discussion and counseling with patient face to face, dictation.    Past Medical History:   dyslipidemia    Past Surgical History:   Past Surgical History:   Procedure Laterality Date    IR LUMBAR PUNCTURE  10/23/2023    IR LUMBAR PUNCTURE  9/26/2016    IR LUMBAR PUNCTURE  9/26/2016    IR LUMBAR PUNCTURE  1/26/2017    IR LUMBAR PUNCTURE  6/21/2017    IR LUMBAR PUNCTURE  5/23/2018    IR LUMBAR PUNCTURE  6/24/2019       Medications (outpatient):  Current Outpatient Medications   Medication Sig Dispense Refill    rosuvastatin (CRESTOR) 5 MG tablet Take 1 tablet (5 mg) by mouth at bedtime (Patient not taking: Reported on 6/14/2024) 90 tablet 3       Allergies:  Allergies   Allergen Reactions    Bacitracin Rash    Trazodone Other (See Comments)     Paradoxial reaction, makes her jittery, awake       Social History:   History   Drug Use Not on file      History   Smoking Status    Never   Smokeless Tobacco    Never     Social History    Substance and Sexual Activity      Alcohol use: Yes        Comment: rare       Family History:  Family History   Problem  "Relation Age of Onset    Anxiety Disorder Mother     Hyperlipidemia Mother     Hypertension Mother     Myocardial Infarction Mother     Goiter Mother     Diabetes Paternal Grandmother     Cerebrovascular Disease Paternal Grandfather     Diabetes Brother     Septicemia Brother     Factor V Leiden deficiency Brother        Review of Systems:   A complete review of systems was negative except as mentioned in the History of Present Illness.     Objective & Physical Exam:  /75   Pulse 65   Ht 1.556 m (5' 1.25\")   Wt 62.3 kg (137 lb 4.8 oz)   SpO2 98%   BMI 25.73 kg/m    Wt Readings from Last 2 Encounters:   06/14/24 62.3 kg (137 lb 4.8 oz)   04/04/24 60.8 kg (134 lb)     Body mass index is 25.73 kg/m .   Body surface area is 1.64 meters squared.    Constitutional: appears stated age, in no apparent distress, appears younger than stated age  Pulmonary: clear to auscultation bilaterally, no wheezes, no rales, no increased work of breathing  Cardiovascular: JVP normal, regular rate, regular rhythm, normal S1 and S2, no S3, S4, no murmur appreciated, no lower extremity edema  Neurologic: awake, alert, moves all extremities  Skin: no jaundice, warm on limited exam, mild ecchymosis left side face    Data reviewed:  Lab Results   Component Value Date    WBC 7.0 04/04/2024    RBC 4.28 04/04/2024    HGB 12.8 04/04/2024    HCT 37.9 04/04/2024    MCV 89 04/04/2024    MCH 29.9 04/04/2024    MCHC 33.8 04/04/2024    RDW 13.2 04/04/2024     04/04/2024     Sodium   Date Value Ref Range Status   04/04/2024 138 135 - 145 mmol/L Final     Comment:     Reference intervals for this test were updated on 09/26/2023 to more accurately reflect our healthy population. There may be differences in the flagging of prior results with similar values performed with this method. Interpretation of those prior results can be made in the context of the updated reference intervals.      Potassium   Date Value Ref Range Status   04/04/2024 " "4.6 3.4 - 5.3 mmol/L Final     Chloride   Date Value Ref Range Status   04/04/2024 101 98 - 107 mmol/L Final     Carbon Dioxide (CO2)   Date Value Ref Range Status   04/04/2024 24 22 - 29 mmol/L Final     Anion Gap   Date Value Ref Range Status   04/04/2024 13 7 - 15 mmol/L Final     Glucose   Date Value Ref Range Status   04/04/2024 146 (H) 70 - 99 mg/dL Final     GLUCOSE BY METER POCT   Date Value Ref Range Status   04/04/2024 139 (H) 70 - 99 mg/dL Final     Urea Nitrogen   Date Value Ref Range Status   04/04/2024 15.6 8.0 - 23.0 mg/dL Final     Creatinine   Date Value Ref Range Status   04/04/2024 0.83 0.51 - 0.95 mg/dL Final     GFR Estimate   Date Value Ref Range Status   04/04/2024 70 >60 mL/min/1.73m2 Final     Calcium   Date Value Ref Range Status   04/04/2024 8.9 8.8 - 10.2 mg/dL Final     Bilirubin Total   Date Value Ref Range Status   04/04/2024 0.3 <=1.2 mg/dL Final     Alkaline Phosphatase   Date Value Ref Range Status   04/04/2024 32 (L) 40 - 150 U/L Final     Comment:     Reference intervals for this test were updated on 11/14/2023 to more accurately reflect our healthy population. There may be differences in the flagging of prior results with similar values performed with this method. Interpretation of those prior results can be made in the context of the updated reference intervals.     ALT   Date Value Ref Range Status   06/12/2024 16 0 - 50 U/L Final     AST   Date Value Ref Range Status   04/04/2024 48 (H) 0 - 45 U/L Final     Comment:     Reference intervals for this test were updated on 6/12/2023 to more accurately reflect our healthy population. There may be differences in the flagging of prior results with similar values performed with this method. Interpretation of those prior results can be made in the context of the updated reference intervals.     Recent Labs   Lab Test 06/12/24  0835   CHOL 227*   HDL 71   *   TRIG 116      No results found for: \"A1C\"     Recent Results (from the " past 4320 hour(s))   Echocardiogram Complete   Result Value    LVEF  60-65%    [facility]    [id number]  [id number]  [id number]  [id number]^BLANCO^LIZBETH^HODA     [facility]  Echocardiography Laboratory  [address] [place] [address]     Name: JEREMY SALVADOR  MRN: [id number]  : 1940  Study Date: 2024 02:48 PM  Age: 83 yrs  Gender: Female  Patient Location: [facility]  Reason For Study: Systolic murmur  Ordering Physician: LIZBETH SIMEON  Referring Physician: LIZBETH SIMEON  Performed By: Guilherme Thompson     BSA: 1.6 m2  Height: 62 in  Weight: 134 lb  HR: 65  BP: 128/79 mmHg  ______________________________________________________________________________  Procedure  Complete Echo Adult.  ______________________________________________________________________________  Interpretation Summary     The left ventricle is normal in structure, function and size.  The visual ejection fraction is 60-65%.  The right ventricle is normal in structure, function and size.  Technically difficult study limited views obtained due to body habitus  ______________________________________________________________________________  Left Ventricle  The left ventricle is normal in structure, function and size. There is normal  left ventricular wall thickness. The visual ejection fraction is 60-65%. No  regional wall motion abnormalities noted.     Right Ventricle  The right ventricle is normal in structure, function and size.     Atria  Normal left atrial size. Right atrial size is normal. There is no atrial shunt  seen.     Mitral Valve  The mitral valve is normal in structure and function. There is trace mitral  regurgitation.     Tricuspid Valve  The tricuspid valve is normal in structure and function. There is trace  tricuspid regurgitation.     Aortic Valve  The aortic valve is normal in structure and function. No aortic regurgitation  is present.     Pulmonic Valve  The pulmonic valve is not well seen, but is grossly  normal.     Vessels  Normal size aorta. Normal size ascending aorta.     Pericardium  There is no pericardial effusion.     Rhythm  Sinus rhythm was noted.  ______________________________________________________________________________  MMode/2D Measurements & Calculations  IVSd: 0.98 cm     LVIDd: 3.7 cm  LVIDs: 2.5 cm  LVPWd: 0.89 cm  FS: 32.2 %  LV mass(C)d: 103.8 grams  LV mass(C)dI: 64.4 grams/m2  Ao root diam: 3.3 cm  LA dimension: 2.3 cm  asc Aorta Diam: 3.1 cm  LA/Ao: 0.70  LVOT diam: 1.8 cm  LVOT area: 2.5 cm2  Ao root diam index Ht(cm/m): 2.1  Ao root diam index BSA (cm/m2): 2.0  Asc Ao diam index BSA (cm/m2): 1.9  Asc Ao diam index Ht(cm/m): 2.0  LA Volume (BP): 19.2 ml     LA Volume Index (BP): 11.9 ml/m2  RV Base: 2.6 cm  RWT: 0.48  TAPSE: 1.6 cm     Doppler Measurements & Calculations  MV E max robert: 53.5 cm/sec  MV A max robert: 79.7 cm/sec  MV E/A: 0.67  MV dec time: 0.25 sec  Ao V2 max: 86.5 cm/sec  Ao max PG: 3.0 mmHg  Ao V2 mean: 60.0 cm/sec  Ao mean P.0 mmHg  Ao V2 VTI: 17.3 cm  ZOILA(I,D): 2.7 cm2  ZOILA(V,D): 2.7 cm2  LV V1 max PG: 3.2 mmHg  LV V1 max: 90.1 cm/sec  LV V1 VTI: 18.3 cm  SV(LVOT): 46.6 ml  SI(LVOT): 28.9 ml/m2  PA acc time: 0.10 sec  AV Robert Ratio (DI): 1.0  ZOILA Index (cm2/m2): 1.7  E/E' av.8     Lateral E/e': 9.6  Medial E/e': 10.0  RV S Robert: 10.6 cm/sec     ______________________________________________________________________________  Report approved by: Noni Bean 2024 04:03 PM

## 2024-06-14 NOTE — PROGRESS NOTES
Cardiology Clinic Progress Note:    June 14, 2024   Patient Name: Bailey Monroe  Patient MRN: [id number]     Consult indication: ***    HPI:    I had the opportunity to see patient Bailey Monroe in cardiology clinic for a follow up visit. Patient is followed by our colleague KELSIE PEREZ MD with Primary Care.     ***    Assessment and Plan/Recommendations:    # ***  # ***  # ***  # ***  # ***  # ***  # ***  # ***    Thank you for allowing our team to participate in the care of Bailey Monroe.  Please do not hesitate to call or page me with any questions or concerns.    Sincerely,     Johnnie Mason MD, [facility]  Cardiology  Text Page   June 14, 2024    cc  Johnnie Mason MD  [address], [address]  [place] [address]    Voice recognition software utilized.     Total time spent on this encounter today: Greater than *** minutes, providing care in this encounter including, but not limited to, reviewing prior medical records, laboratory data, imaging studies, diagnostic studies, procedure notes, formulating an assessment and plan, recommendations, discussion and counseling with patient face to face, dictation.    Past Medical History:   The ASCVD Risk score (Carlos DK, et al., 2019) failed to calculate for the following reasons:    The 2019 ASCVD risk score is only valid for ages 40 to 79  No past medical history on file.     Past Surgical History:   Past Surgical History:   Procedure Laterality Date    IR LUMBAR PUNCTURE  10/23/2023    IR LUMBAR PUNCTURE  9/26/2016    IR LUMBAR PUNCTURE  9/26/2016    IR LUMBAR PUNCTURE  1/26/2017    IR LUMBAR PUNCTURE  6/21/2017    IR LUMBAR PUNCTURE  5/23/2018    IR LUMBAR PUNCTURE  6/24/2019       Medications (outpatient):  Current Outpatient Medications   Medication Sig Dispense Refill    rosuvastatin (CRESTOR) 5 MG tablet Take 1 tablet (5 mg) by mouth at bedtime (Patient not taking: Reported on 6/14/2024) 90 tablet 3       Allergies:  Allergies  "  Allergen Reactions    Bacitracin Rash    Trazodone Other (See Comments)     Paradoxial reaction, makes her jittery, awake       Social History:   History   Drug Use Not on file      History   Smoking Status    Never   Smokeless Tobacco    Never     Social History    Substance and Sexual Activity      Alcohol use: Yes        Comment: rare       Family History:  Family History   Problem Relation Age of Onset    Anxiety Disorder Mother     Hyperlipidemia Mother     Hypertension Mother     Myocardial Infarction Mother     Goiter Mother     Diabetes Paternal Grandmother     Cerebrovascular Disease Paternal Grandfather     Diabetes Brother     Septicemia Brother     Factor V Leiden deficiency Brother        Review of Systems:   A complete review of systems was negative except as mentioned in the History of Present Illness.     Objective & Physical Exam:  /75   Pulse 65   Ht 1.556 m (5' 1.25\")   Wt 62.3 kg (137 lb 4.8 oz)   SpO2 98%   BMI 25.73 kg/m    Wt Readings from Last 2 Encounters:   06/14/24 62.3 kg (137 lb 4.8 oz)   04/04/24 60.8 kg (134 lb)     Body mass index is 25.73 kg/m .   Body surface area is 1.64 meters squared.  ***  Constitutional: appears stated age, in no apparent distress, appears to be well nourished  Pulmonary: clear to auscultation bilaterally, no wheezes, no rales, no increased work of breathing  Cardiovascular: JVP normal, regular rate, regular rhythm, normal S1 and S2, no S3, S4, no murmur appreciated, no lower extremity edema***  Gastrointestinal: no guarding, non-rigid   Neurologic: awake, alert, moves all extremities  Skin: no jaundice, warm on limited exam    Data reviewed:  Lab Results   Component Value Date    WBC 7.0 04/04/2024    RBC 4.28 04/04/2024    HGB 12.8 04/04/2024    HCT 37.9 04/04/2024    MCV 89 04/04/2024    MCH 29.9 04/04/2024    MCHC 33.8 04/04/2024    RDW 13.2 04/04/2024     04/04/2024     Sodium   Date Value Ref Range Status   04/04/2024 138 135 - 145 " mmol/L Final     Comment:     Reference intervals for this test were updated on 09/26/2023 to more accurately reflect our healthy population. There may be differences in the flagging of prior results with similar values performed with this method. Interpretation of those prior results can be made in the context of the updated reference intervals.      Potassium   Date Value Ref Range Status   04/04/2024 4.6 3.4 - 5.3 mmol/L Final     Chloride   Date Value Ref Range Status   04/04/2024 101 98 - 107 mmol/L Final     Carbon Dioxide (CO2)   Date Value Ref Range Status   04/04/2024 24 22 - 29 mmol/L Final     Anion Gap   Date Value Ref Range Status   04/04/2024 13 7 - 15 mmol/L Final     Glucose   Date Value Ref Range Status   04/04/2024 146 (H) 70 - 99 mg/dL Final     GLUCOSE BY METER POCT   Date Value Ref Range Status   04/04/2024 139 (H) 70 - 99 mg/dL Final     Urea Nitrogen   Date Value Ref Range Status   04/04/2024 15.6 8.0 - 23.0 mg/dL Final     Creatinine   Date Value Ref Range Status   04/04/2024 0.83 0.51 - 0.95 mg/dL Final     GFR Estimate   Date Value Ref Range Status   04/04/2024 70 >60 mL/min/1.73m2 Final     Calcium   Date Value Ref Range Status   04/04/2024 8.9 8.8 - 10.2 mg/dL Final     Bilirubin Total   Date Value Ref Range Status   04/04/2024 0.3 <=1.2 mg/dL Final     Alkaline Phosphatase   Date Value Ref Range Status   04/04/2024 32 (L) 40 - 150 U/L Final     Comment:     Reference intervals for this test were updated on 11/14/2023 to more accurately reflect our healthy population. There may be differences in the flagging of prior results with similar values performed with this method. Interpretation of those prior results can be made in the context of the updated reference intervals.     ALT   Date Value Ref Range Status   06/12/2024 16 0 - 50 U/L Final     AST   Date Value Ref Range Status   04/04/2024 48 (H) 0 - 45 U/L Final     Comment:     Reference intervals for this test were updated on  "2023 to more accurately reflect our healthy population. There may be differences in the flagging of prior results with similar values performed with this method. Interpretation of those prior results can be made in the context of the updated reference intervals.     Recent Labs   Lab Test 24  0835   CHOL 227*   HDL 71   *   TRIG 116      No results found for: \"A1C\"     Recent Results (from the past 4320 hour(s))   Echocardiogram Complete   Result Value    LVEF  60-65%    [facility]    [id number]  [id number]  [id number]  [id number]^BLANCO^LIZBETH^HODA     [facility]  Echocardiography Laboratory  [address] [place] [address]     Name: JEREMY SALVADOR  MRN: [id number]  : 1940  Study Date: 2024 02:48 PM  Age: 83 yrs  Gender: Female  Patient Location: [facility]  Reason For Study: Systolic murmur  Ordering Physician: LIZBETH SIMEON  Referring Physician: LIZBETH SIMEON  Performed By: Guilherme Thompson     BSA: 1.6 m2  Height: 62 in  Weight: 134 lb  HR: 65  BP: 128/79 mmHg  ______________________________________________________________________________  Procedure  Complete Echo Adult.  ______________________________________________________________________________  Interpretation Summary     The left ventricle is normal in structure, function and size.  The visual ejection fraction is 60-65%.  The right ventricle is normal in structure, function and size.  Technically difficult study limited views obtained due to body habitus  ______________________________________________________________________________  Left Ventricle  The left ventricle is normal in structure, function and size. There is normal  left ventricular wall thickness. The visual ejection fraction is 60-65%. No  regional wall motion abnormalities noted.     Right Ventricle  The right ventricle is normal in structure, function and size.     Atria  Normal left atrial size. Right atrial size is normal. There is no atrial " shunt  seen.     Mitral Valve  The mitral valve is normal in structure and function. There is trace mitral  regurgitation.     Tricuspid Valve  The tricuspid valve is normal in structure and function. There is trace  tricuspid regurgitation.     Aortic Valve  The aortic valve is normal in structure and function. No aortic regurgitation  is present.     Pulmonic Valve  The pulmonic valve is not well seen, but is grossly normal.     Vessels  Normal size aorta. Normal size ascending aorta.     Pericardium  There is no pericardial effusion.     Rhythm  Sinus rhythm was noted.  ______________________________________________________________________________  MMode/2D Measurements & Calculations  IVSd: 0.98 cm     LVIDd: 3.7 cm  LVIDs: 2.5 cm  LVPWd: 0.89 cm  FS: 32.2 %  LV mass(C)d: 103.8 grams  LV mass(C)dI: 64.4 grams/m2  Ao root diam: 3.3 cm  LA dimension: 2.3 cm  asc Aorta Diam: 3.1 cm  LA/Ao: 0.70  LVOT diam: 1.8 cm  LVOT area: 2.5 cm2  Ao root diam index Ht(cm/m): 2.1  Ao root diam index BSA (cm/m2): 2.0  Asc Ao diam index BSA (cm/m2): 1.9  Asc Ao diam index Ht(cm/m): 2.0  LA Volume (BP): 19.2 ml     LA Volume Index (BP): 11.9 ml/m2  RV Base: 2.6 cm  RWT: 0.48  TAPSE: 1.6 cm     Doppler Measurements & Calculations  MV E max robert: 53.5 cm/sec  MV A max robert: 79.7 cm/sec  MV E/A: 0.67  MV dec time: 0.25 sec  Ao V2 max: 86.5 cm/sec  Ao max PG: 3.0 mmHg  Ao V2 mean: 60.0 cm/sec  Ao mean P.0 mmHg  Ao V2 VTI: 17.3 cm  ZOILA(I,D): 2.7 cm2  ZOILA(V,D): 2.7 cm2  LV V1 max PG: 3.2 mmHg  LV V1 max: 90.1 cm/sec  LV V1 VTI: 18.3 cm  SV(LVOT): 46.6 ml  SI(LVOT): 28.9 ml/m2  PA acc time: 0.10 sec  AV Robert Ratio (DI): 1.0  ZOILA Index (cm2/m2): 1.7  E/E' av.8     Lateral E/e': 9.6  Medial E/e': 10.0  RV S Robert: 10.6 cm/sec     ______________________________________________________________________________  Report approved by: Noni Bean 2024 04:03 PM

## 2024-06-14 NOTE — LETTER
6/14/2024    KELSIE PEREZ MD  [address] [place] [address]    RE: Bailey Monroe       Dear Colleague,     I had the pleasure of seeing Bailey Monroe in the [facility].      Cardiology Clinic Progress Note:    June 14, 2024   Patient Name: Bailey Monroe  Patient MRN: [id number]     Consult indication: dyslipidemia    HPI:    I had the opportunity to see patient Bailey Monroe in cardiology clinic for a follow up visit.     As you know patient is a pleasant 83-year-old female with a past medical history significant for coronary artery calcifications, dyslipidemia, who presents for follow-up.    I had met patient in clinic for a consultation 3/1/2024.  At that time we reviewed her history of dyslipidemia, as well as coronary artery calcifications on a prior CT PE study.  We recommended starting rosuvastatin 5 mg daily.  Patient has held off on this in favor of monitoring lipids with lifestyle modification, follow-up lipids from a couple of days ago total cholesterol 227, HDL 71, , triglycerides 116.  I did appreciate a soft systolic murmur on exam, so patient was assessed with an echocardiogram 5/16/2024 that demonstrated normal cardiac structure and function, mild aortic valve calcification with no evidence of stenosis.  Patient continues to do well, she continues to be very active, denies any chest pain, chest pressure, abnormal shortness of breath.  Unfortunately she did have a fall couple of months ago, she went to the bathroom and used the toilet, and shortly afterwards lost consciousness striking her head.  Fortunately workup in the ED was reassuring with CT head and neck imaging demonstrating no acute abnormalities.  On inquiry, she does note that she likely does not consume enough fluids throughout the day.  She denies any preceding palpitations.  ECG from 4/2024 normal sinus rhythm with occasional PACs.  No evidence of preexcitation.  QTc 426  ms.    Assessment and Plan/Recommendations:    # Dyslipidemia, mild CAC on CT PE study   # Syncope, I concur with ED assessment seems most consistent with vasovagal syncope, likely exacerbated by baseline inadequate fluid intake    - Revisited statin therapy, will plan to start rosuvastatin 5 mg nightly with fasting lipids and ALT in 3 months  - Encouraged increased fluid intake throughout the day  - Follow-up in cardiology clinic as needed    Thank you for allowing our team to participate in the care of Bailey Monroe.  Please do not hesitate to call or page me with any questions or concerns.    Sincerely,     Johnnie Mason MD, [facility]  Cardiology  Text Page   June 14, 2024    cc  Johnnie Mason MD  [address]  [place] [address]    Voice recognition software utilized.     Total time spent on this encounter today: Greater than 20 minutes, providing care in this encounter including, but not limited to, reviewing prior medical records, laboratory data, imaging studies, diagnostic studies, procedure notes, formulating an assessment and plan, recommendations, discussion and counseling with patient face to face, dictation.    Past Medical History:   dyslipidemia    Past Surgical History:   Past Surgical History:   Procedure Laterality Date    IR LUMBAR PUNCTURE  10/23/2023    IR LUMBAR PUNCTURE  9/26/2016    IR LUMBAR PUNCTURE  9/26/2016    IR LUMBAR PUNCTURE  1/26/2017    IR LUMBAR PUNCTURE  6/21/2017    IR LUMBAR PUNCTURE  5/23/2018    IR LUMBAR PUNCTURE  6/24/2019       Medications (outpatient):  Current Outpatient Medications   Medication Sig Dispense Refill    rosuvastatin (CRESTOR) 5 MG tablet Take 1 tablet (5 mg) by mouth at bedtime (Patient not taking: Reported on 6/14/2024) 90 tablet 3       Allergies:  Allergies   Allergen Reactions    Bacitracin Rash    Trazodone Other (See Comments)     Paradoxial reaction, makes her jittery, awake       Social History:   History   Drug Use Not on  "file      History   Smoking Status    Never   Smokeless Tobacco    Never     Social History    Substance and Sexual Activity      Alcohol use: Yes        Comment: rare       Family History:  Family History   Problem Relation Age of Onset    Anxiety Disorder Mother     Hyperlipidemia Mother     Hypertension Mother     Myocardial Infarction Mother     Goiter Mother     Diabetes Paternal Grandmother     Cerebrovascular Disease Paternal Grandfather     Diabetes Brother     Septicemia Brother     Factor V Leiden deficiency Brother        Review of Systems:   A complete review of systems was negative except as mentioned in the History of Present Illness.     Objective & Physical Exam:  /75   Pulse 65   Ht 1.556 m (5' 1.25\")   Wt 62.3 kg (137 lb 4.8 oz)   SpO2 98%   BMI 25.73 kg/m    Wt Readings from Last 2 Encounters:   06/14/24 62.3 kg (137 lb 4.8 oz)   04/04/24 60.8 kg (134 lb)     Body mass index is 25.73 kg/m .   Body surface area is 1.64 meters squared.    Constitutional: appears stated age, in no apparent distress, appears younger than stated age  Pulmonary: clear to auscultation bilaterally, no wheezes, no rales, no increased work of breathing  Cardiovascular: JVP normal, regular rate, regular rhythm, normal S1 and S2, no S3, S4, no murmur appreciated, no lower extremity edema  Neurologic: awake, alert, moves all extremities  Skin: no jaundice, warm on limited exam, mild ecchymosis left side face    Data reviewed:  Lab Results   Component Value Date    WBC 7.0 04/04/2024    RBC 4.28 04/04/2024    HGB 12.8 04/04/2024    HCT 37.9 04/04/2024    MCV 89 04/04/2024    MCH 29.9 04/04/2024    MCHC 33.8 04/04/2024    RDW 13.2 04/04/2024     04/04/2024     Sodium   Date Value Ref Range Status   04/04/2024 138 135 - 145 mmol/L Final     Comment:     Reference intervals for this test were updated on 09/26/2023 to more accurately reflect our healthy population. There may be differences in the flagging of prior " results with similar values performed with this method. Interpretation of those prior results can be made in the context of the updated reference intervals.      Potassium   Date Value Ref Range Status   04/04/2024 4.6 3.4 - 5.3 mmol/L Final     Chloride   Date Value Ref Range Status   04/04/2024 101 98 - 107 mmol/L Final     Carbon Dioxide (CO2)   Date Value Ref Range Status   04/04/2024 24 22 - 29 mmol/L Final     Anion Gap   Date Value Ref Range Status   04/04/2024 13 7 - 15 mmol/L Final     Glucose   Date Value Ref Range Status   04/04/2024 146 (H) 70 - 99 mg/dL Final     GLUCOSE BY METER POCT   Date Value Ref Range Status   04/04/2024 139 (H) 70 - 99 mg/dL Final     Urea Nitrogen   Date Value Ref Range Status   04/04/2024 15.6 8.0 - 23.0 mg/dL Final     Creatinine   Date Value Ref Range Status   04/04/2024 0.83 0.51 - 0.95 mg/dL Final     GFR Estimate   Date Value Ref Range Status   04/04/2024 70 >60 mL/min/1.73m2 Final     Calcium   Date Value Ref Range Status   04/04/2024 8.9 8.8 - 10.2 mg/dL Final     Bilirubin Total   Date Value Ref Range Status   04/04/2024 0.3 <=1.2 mg/dL Final     Alkaline Phosphatase   Date Value Ref Range Status   04/04/2024 32 (L) 40 - 150 U/L Final     Comment:     Reference intervals for this test were updated on 11/14/2023 to more accurately reflect our healthy population. There may be differences in the flagging of prior results with similar values performed with this method. Interpretation of those prior results can be made in the context of the updated reference intervals.     ALT   Date Value Ref Range Status   06/12/2024 16 0 - 50 U/L Final     AST   Date Value Ref Range Status   04/04/2024 48 (H) 0 - 45 U/L Final     Comment:     Reference intervals for this test were updated on 6/12/2023 to more accurately reflect our healthy population. There may be differences in the flagging of prior results with similar values performed with this method. Interpretation of those prior  "results can be made in the context of the updated reference intervals.     Recent Labs   Lab Test 24  0835   CHOL 227*   HDL 71   *   TRIG 116      No results found for: \"A1C\"     Recent Results (from the past 4320 hour(s))   Echocardiogram Complete   Result Value    LVEF  60-65%    Narrative    [id number]  [id number]  [id number]  [id number]^BLANCO^LIZBETH^HODA     [facility]  Echocardiography Laboratory  [address] [place] [address]     Name: JEREMY SALVADOR  MRN: [id number]  : 1940  Study Date: 2024 02:48 PM  Age: 83 yrs  Gender: Female  Patient Location: [facility]  Reason For Study: Systolic murmur  Ordering Physician: LIZBETH SIMEON  Referring Physician: LIZBETH SIMEON  Performed By: Guilherme Thompson     BSA: 1.6 m2  Height: 62 in  Weight: 134 lb  HR: 65  BP: 128/79 mmHg  ______________________________________________________________________________  Procedure  Complete Echo Adult.  ______________________________________________________________________________  Interpretation Summary     The left ventricle is normal in structure, function and size.  The visual ejection fraction is 60-65%.  The right ventricle is normal in structure, function and size.  Technically difficult study limited views obtained due to body habitus  ______________________________________________________________________________  Left Ventricle  The left ventricle is normal in structure, function and size. There is normal  left ventricular wall thickness. The visual ejection fraction is 60-65%. No  regional wall motion abnormalities noted.     Right Ventricle  The right ventricle is normal in structure, function and size.     Atria  Normal left atrial size. Right atrial size is normal. There is no atrial shunt  seen.     Mitral Valve  The mitral valve is normal in structure and function. There is trace mitral  regurgitation.     Tricuspid Valve  The tricuspid valve is normal in structure and function. " There is trace  tricuspid regurgitation.     Aortic Valve  The aortic valve is normal in structure and function. No aortic regurgitation  is present.     Pulmonic Valve  The pulmonic valve is not well seen, but is grossly normal.     Vessels  Normal size aorta. Normal size ascending aorta.     Pericardium  There is no pericardial effusion.     Rhythm  Sinus rhythm was noted.  ______________________________________________________________________________  MMode/2D Measurements & Calculations  IVSd: 0.98 cm     LVIDd: 3.7 cm  LVIDs: 2.5 cm  LVPWd: 0.89 cm  FS: 32.2 %  LV mass(C)d: 103.8 grams  LV mass(C)dI: 64.4 grams/m2  Ao root diam: 3.3 cm  LA dimension: 2.3 cm  asc Aorta Diam: 3.1 cm  LA/Ao: 0.70  LVOT diam: 1.8 cm  LVOT area: 2.5 cm2  Ao root diam index Ht(cm/m): 2.1  Ao root diam index BSA (cm/m2): 2.0  Asc Ao diam index BSA (cm/m2): 1.9  Asc Ao diam index Ht(cm/m): 2.0  LA Volume (BP): 19.2 ml     LA Volume Index (BP): 11.9 ml/m2  RV Base: 2.6 cm  RWT: 0.48  TAPSE: 1.6 cm     Doppler Measurements & Calculations  MV E max robert: 53.5 cm/sec  MV A max robert: 79.7 cm/sec  MV E/A: 0.67  MV dec time: 0.25 sec  Ao V2 max: 86.5 cm/sec  Ao max PG: 3.0 mmHg  Ao V2 mean: 60.0 cm/sec  Ao mean P.0 mmHg  Ao V2 VTI: 17.3 cm  ZOILA(I,D): 2.7 cm2  ZOILA(V,D): 2.7 cm2  LV V1 max PG: 3.2 mmHg  LV V1 max: 90.1 cm/sec  LV V1 VTI: 18.3 cm  SV(LVOT): 46.6 ml  SI(LVOT): 28.9 ml/m2  PA acc time: 0.10 sec  AV Robert Ratio (DI): 1.0  ZOILA Index (cm2/m2): 1.7  E/E' av.8     Lateral E/e': 9.6  Medial E/e': 10.0  RV S Robert: 10.6 cm/sec     ______________________________________________________________________________  Report approved by: Noni Bean 2024 04:03 PM                Thank you for allowing me to participate in the care of your patient.      Sincerely,     Johnnie Mason MD     [facility]  cc:   Johnnie Mason MD  [address]  [place] [address]

## 2024-06-14 NOTE — PATIENT INSTRUCTIONS
June 14, 2024    Thank you for allowing our Cardiology team to participate in your care.     Please note the following changes to your heart treatment plan:     Medication changes:   - start rosuvastatin 5mg at bedtime     Tests to be done:  - fasting labs in 3 months    Follow up:  - Follow up as needed      For scheduling, please call [phone].      Please contact our team through [employer] or our Nurse Team Voicemail service [phone], or the General Clinic [phone] for any questions or concerns.     If you are having a medical emergency, please call [phone].     Sincerely,    Johnnie Mason MD, FACC  Cardiology    [facility] [place]    
No

## 2024-06-26 ENCOUNTER — MYC MEDICAL ADVICE (OUTPATIENT)
Dept: CARDIOLOGY | Facility: CLINIC | Age: 84
End: 2024-06-26
Payer: MEDICARE

## 2024-06-26 NOTE — TELEPHONE ENCOUNTER
My chart message from patient:  Bailey Monroe  [facility] Heart Team 2  Phone Number: [phone]     I'm having a couple of side effects with this cholesterol  medicine.  My shoulder joints ache and my leg muscles are weak.  After sitting for awhile it's hard to stand and walk.  I think I need to stop this medicine.    Bailey Monroe  [phone]      1330 reply from Dr. Mason:  Ok thanks for the update, recommend follow up on lipids with PCP     Reply to patient:  Prema Ms. Monroe,    We are sorry the statin therapy did not help. Please stop the medication.    Dr. Mason suggests that your work with Dr. Petersen's team to search out other options for managing the lipids.    Thank you  Team 2 R.N.s  [phone]

## 2024-07-01 NOTE — TELEPHONE ENCOUNTER
Patient plans to remain off cholesterol lowing medications at this time and continue to monitor lipid levels.

## 2024-09-13 ENCOUNTER — LAB (OUTPATIENT)
Dept: LAB | Facility: CLINIC | Age: 84
End: 2024-09-13
Payer: MEDICARE

## 2024-09-13 DIAGNOSIS — E78.5 DYSLIPIDEMIA: ICD-10-CM

## 2024-09-13 LAB
ALT SERPL W P-5'-P-CCNC: 16 U/L (ref 0–50)
CHOLEST SERPL-MCNC: 233 MG/DL
FASTING STATUS PATIENT QL REPORTED: YES
HDLC SERPL-MCNC: 67 MG/DL
LDLC SERPL CALC-MCNC: 135 MG/DL
NONHDLC SERPL-MCNC: 166 MG/DL
TRIGL SERPL-MCNC: 153 MG/DL

## 2024-09-13 PROCEDURE — 80061 LIPID PANEL: CPT | Performed by: INTERNAL MEDICINE

## 2024-09-13 PROCEDURE — 84460 ALANINE AMINO (ALT) (SGPT): CPT | Performed by: INTERNAL MEDICINE

## 2024-09-13 PROCEDURE — 36415 COLL VENOUS BLD VENIPUNCTURE: CPT | Performed by: INTERNAL MEDICINE

## 2024-09-18 ENCOUNTER — TELEPHONE (OUTPATIENT)
Dept: CARDIOLOGY | Facility: CLINIC | Age: 84
End: 2024-09-18
Payer: MEDICARE

## 2024-09-18 NOTE — RESULT ENCOUNTER NOTE
Results reviewed, please let the patient know that overall findings are stable, I recommend holding off on statins weighing risks/benefits, particularly with the prior adverse side effects even at low doses, and I recommend continued healthy lifestyle habits-- regular aerobic exercise, heart healthy Mediterranean diet thanks

## 2024-09-18 NOTE — TELEPHONE ENCOUNTER
----- Message from Johnnie Mason sent at 9/18/2024 12:21 PM CDT -----  Results reviewed, please let the patient know that overall findings are stable, I recommend holding off on statins weighing risks/benefits, particularly with the prior adverse side effects even at low doses, and I recommend continued healthy lifestyle habits-- regular aerobic exercise, heart healthy Mediterranean diet thanks      ---------------------------------------------------    Patient called to inform that Dr. Mason has reviewed lipid profile and ALT and of above interpretation and recommendation. Patient educated on a heart healthy mediteranian diet and encouraged to follow up with Cardiology as needed. Patient appreciates call and verbalizes understanding.

## 2025-01-26 ENCOUNTER — HEALTH MAINTENANCE LETTER (OUTPATIENT)
Age: 85
End: 2025-01-26

## 2025-02-23 ENCOUNTER — HEALTH MAINTENANCE LETTER (OUTPATIENT)
Age: 85
End: 2025-02-23

## 2025-07-01 ENCOUNTER — OFFICE VISIT (OUTPATIENT)
Dept: CARDIOLOGY | Facility: CLINIC | Age: 85
End: 2025-07-01
Payer: MEDICARE

## 2025-07-01 VITALS — BODY MASS INDEX: 25.21 KG/M2 | HEIGHT: 62 IN | WEIGHT: 137 LBS

## 2025-07-01 DIAGNOSIS — R55 SYNCOPE, UNSPECIFIED SYNCOPE TYPE: Primary | ICD-10-CM

## 2025-07-01 DIAGNOSIS — I25.10 CORONARY ARTERY CALCIFICATION: ICD-10-CM

## 2025-07-01 DIAGNOSIS — R55 VASOVAGAL SYNCOPE: ICD-10-CM

## 2025-07-01 NOTE — PROGRESS NOTES
Cardiology Clinic Progress Note:    2025   Patient Name: Bailey Monroe  Patient MRN: [id number]     Consult indication: syncope     HPI:    I had the opportunity to see patient Bailey Monroe in cardiology clinic for a follow up visit.     As you know, patient is a pleasant 84-year-old female with a past medical history significant for coronary artery calcifications, dyslipidemia, vasovagal syncope, who presents for further evaluation and management of a syncopal episode.    Patient reports that she was in her usual state of health until she was at a , standing in line in a warm vestibule of a country club, had not eaten or drank much earlier that day, when she felt acutely dizzy/lightheaded, with a warm flushing sensation in her face, overwhelming cold sweat, clamminess, nausea/vomiting, and brief syncope.  EMS reported hypotension initially which slowly resolved.  She has not had recurrence of this since then.  She otherwise feels well, denies any chest pain, chest pressure, normal shortness of breath.  Does have occasional palpitations, not associated with dizziness or lightheadedness.    Following this episode she did go to [place] with her son and his partner who is a physician, and did reasonably well after about a week of adjustment.  Planning to go back to [place] in September, has traveled there several dozen times.    Assessment and Plan/Recommendations:    # Syncope, most consistent with vasovagal syncope, has a prior history of vasovagal syncope  # Dyslipidemia, mild CAC on CT PE study, trialed on low dose statin though had myalgias, now off statins. No angina     -Reviewed vasovagal syncope, lifestyle modifications including ensuring adequate hydration, eating throughout the day, avoiding prolonged standing, warm crowds, potential triggers  - Zio patch monitor  - Follow-up in cardiology clinic as needed     Thank you for allowing our team to participate in the care of Bailey CAMPOS  "Mabel.  Please do not hesitate to call or page me with any questions or concerns.    Sincerely,     Johnnie Mason MD, [facility]  Cardiology  July 1, 2025      Voice recognition software utilized.     Total time spent on this encounter today: Greater than 20 minutes, providing care in this encounter including, but not limited to, reviewing prior medical records, laboratory data, imaging studies, diagnostic studies, procedure notes, formulating an assessment and plan, recommendations, discussion and counseling with patient face to face, dictation.    Past Medical History:     Past Medical History:   Diagnosis Date    Coronary artery calcification 07/01/2025    Vasovagal syncope 07/01/2025        Past Surgical History:   Past Surgical History:   Procedure Laterality Date    IR LUMBAR PUNCTURE  10/23/2023    IR LUMBAR PUNCTURE  9/26/2016    IR LUMBAR PUNCTURE  9/26/2016    IR LUMBAR PUNCTURE  1/26/2017    IR LUMBAR PUNCTURE  6/21/2017    IR LUMBAR PUNCTURE  5/23/2018    IR LUMBAR PUNCTURE  6/24/2019       Medications (outpatient):  No current outpatient medications on file.       Allergies:  Allergies   Allergen Reactions    Crestor [Rosuvastatin] Muscle Pain (Myalgia)    Bacitracin Rash    Trazodone Other (See Comments)     Paradoxial reaction, makes her jittery, awake       Social History:   History   Drug Use Not on file      History   Smoking Status    Never   Smokeless Tobacco    Never     Social History    Substance and Sexual Activity      Alcohol use: Yes        Comment: rare       Family History:  Family History   Problem Relation Age of Onset    Anxiety Disorder Mother     Hyperlipidemia Mother     Hypertension Mother     Myocardial Infarction Mother     Goiter Mother     Diabetes Paternal Grandmother     Cerebrovascular Disease Paternal Grandfather     Diabetes Brother     Septicemia Brother     Factor V Leiden deficiency Brother          Objective & Physical Exam:  Ht 1.575 m (5' 2\")   Wt 62.1 " kg (137 lb)   BMI 25.06 kg/m    Wt Readings from Last 2 Encounters:   07/01/25 62.1 kg (137 lb)   06/14/24 62.3 kg (137 lb 4.8 oz)     Body mass index is 25.06 kg/m .   Body surface area is 1.65 meters squared.    Constitutional: appears stated age, in no apparent distress, appears to be well nourished  Pulmonary: clear to auscultation bilaterally, no wheezes, no rales, no increased work of breathing  Cardiovascular: JVP normal, regular rate, regular rhythm, no murmur appreciated, no lower extremity edema    Data reviewed:  Lab Results   Component Value Date    WBC 7.0 04/04/2024    RBC 4.28 04/04/2024    HGB 12.8 04/04/2024    HCT 37.9 04/04/2024    MCV 89 04/04/2024    MCH 29.9 04/04/2024    MCHC 33.8 04/04/2024    RDW 13.2 04/04/2024     04/04/2024     Sodium   Date Value Ref Range Status   04/04/2024 138 135 - 145 mmol/L Final     Comment:     Reference intervals for this test were updated on 09/26/2023 to more accurately reflect our healthy population. There may be differences in the flagging of prior results with similar values performed with this method. Interpretation of those prior results can be made in the context of the updated reference intervals.      Potassium   Date Value Ref Range Status   04/04/2024 4.6 3.4 - 5.3 mmol/L Final     Chloride   Date Value Ref Range Status   04/04/2024 101 98 - 107 mmol/L Final     Carbon Dioxide (CO2)   Date Value Ref Range Status   04/04/2024 24 22 - 29 mmol/L Final     Anion Gap   Date Value Ref Range Status   04/04/2024 13 7 - 15 mmol/L Final     Glucose   Date Value Ref Range Status   04/04/2024 146 (H) 70 - 99 mg/dL Final     GLUCOSE BY METER POCT   Date Value Ref Range Status   04/04/2024 139 (H) 70 - 99 mg/dL Final     Urea Nitrogen   Date Value Ref Range Status   04/04/2024 15.6 8.0 - 23.0 mg/dL Final     Creatinine   Date Value Ref Range Status   04/04/2024 0.83 0.51 - 0.95 mg/dL Final     GFR Estimate   Date Value Ref Range Status   04/04/2024 70 >60  mL/min/1.73m2 Final     Calcium   Date Value Ref Range Status   04/04/2024 8.9 8.8 - 10.2 mg/dL Final     Bilirubin Total   Date Value Ref Range Status   04/04/2024 0.3 <=1.2 mg/dL Final     Alkaline Phosphatase   Date Value Ref Range Status   04/04/2024 32 (L) 40 - 150 U/L Final     Comment:     Reference intervals for this test were updated on 11/14/2023 to more accurately reflect our healthy population. There may be differences in the flagging of prior results with similar values performed with this method. Interpretation of those prior results can be made in the context of the updated reference intervals.     ALT   Date Value Ref Range Status   09/13/2024 16 0 - 50 U/L Final     AST   Date Value Ref Range Status   04/04/2024 48 (H) 0 - 45 U/L Final     Comment:     Reference intervals for this test were updated on 6/12/2023 to more accurately reflect our healthy population. There may be differences in the flagging of prior results with similar values performed with this method. Interpretation of those prior results can be made in the context of the updated reference intervals.     Recent Labs   Lab Test 09/13/24  0928 06/12/24  0835   CHOL 233* 227*   HDL 67 71   * 133*   TRIG 153* 116

## 2025-07-01 NOTE — LETTER
2025    KELSIE PEREZ MD  [address] [place] [address]    RE: Bailey Monroe       Dear Colleague,     I had the pleasure of seeing Bailey Mornoe in the [facility].      Cardiology Clinic Progress Note:    2025   Patient Name: Bailey Monroe  Patient MRN: [id number]     Consult indication: syncope     HPI:    I had the opportunity to see patient Bailey Monroe in cardiology clinic for a follow up visit.     As you know, patient is a pleasant 84-year-old female with a past medical history significant for coronary artery calcifications, dyslipidemia, vasovagal syncope, who presents for further evaluation and management of a syncopal episode.    Patient reports that she was in her usual state of health until she was at a , standing in line in a warm vestibule of a country club, had not eaten or drank much earlier that day, when she felt acutely dizzy/lightheaded, with a warm flushing sensation in her face, overwhelming cold sweat, clamminess, nausea/vomiting, and brief syncope.  EMS reported hypotension initially which slowly resolved.  She has not had recurrence of this since then.  She otherwise feels well, denies any chest pain, chest pressure, normal shortness of breath.  Does have occasional palpitations, not associated with dizziness or lightheadedness.    Following this episode she did go to [place] with her son and his partner who is a physician, and did reasonably well after about a week of adjustment.  Planning to go back to [place] in September, has traveled there several dozen times.    Assessment and Plan/Recommendations:    # Syncope, most consistent with vasovagal syncope, has a prior history of vasovagal syncope  # Dyslipidemia, mild CAC on CT PE study, trialed on low dose statin though had myalgias, now off statins. No angina     -Reviewed vasovagal syncope, lifestyle modifications including ensuring adequate hydration, eating  throughout the day, avoiding prolonged standing, warm crowds, potential triggers  - Zio patch monitor  - Follow-up in cardiology clinic as needed     Thank you for allowing our team to participate in the care of Bailey CAMPOS Mabel.  Please do not hesitate to call or page me with any questions or concerns.    Sincerely,     Johnnie Mason MD, [facility]  Cardiology  July 1, 2025      Voice recognition software utilized.     Total time spent on this encounter today: Greater than 20 minutes, providing care in this encounter including, but not limited to, reviewing prior medical records, laboratory data, imaging studies, diagnostic studies, procedure notes, formulating an assessment and plan, recommendations, discussion and counseling with patient face to face, dictation.    Past Medical History:     Past Medical History:   Diagnosis Date     Coronary artery calcification 07/01/2025     Vasovagal syncope 07/01/2025        Past Surgical History:   Past Surgical History:   Procedure Laterality Date     IR LUMBAR PUNCTURE  10/23/2023     IR LUMBAR PUNCTURE  9/26/2016     IR LUMBAR PUNCTURE  9/26/2016     IR LUMBAR PUNCTURE  1/26/2017     IR LUMBAR PUNCTURE  6/21/2017     IR LUMBAR PUNCTURE  5/23/2018     IR LUMBAR PUNCTURE  6/24/2019       Medications (outpatient):  No current outpatient medications on file.       Allergies:  Allergies   Allergen Reactions     Crestor [Rosuvastatin] Muscle Pain (Myalgia)     Bacitracin Rash     Trazodone Other (See Comments)     Paradoxial reaction, makes her jittery, awake       Social History:   History   Drug Use Not on file      History   Smoking Status     Never   Smokeless Tobacco     Never     Social History    Substance and Sexual Activity      Alcohol use: Yes        Comment: rare       Family History:  Family History   Problem Relation Age of Onset     Anxiety Disorder Mother      Hyperlipidemia Mother      Hypertension Mother      Myocardial Infarction Mother      Goiter  "Mother      Diabetes Paternal Grandmother      Cerebrovascular Disease Paternal Grandfather      Diabetes Brother      Septicemia Brother      Factor V Leiden deficiency Brother          Objective & Physical Exam:  Ht 1.575 m (5' 2\")   Wt 62.1 kg (137 lb)   BMI 25.06 kg/m    Wt Readings from Last 2 Encounters:   07/01/25 62.1 kg (137 lb)   06/14/24 62.3 kg (137 lb 4.8 oz)     Body mass index is 25.06 kg/m .   Body surface area is 1.65 meters squared.    Constitutional: appears stated age, in no apparent distress, appears to be well nourished  Pulmonary: clear to auscultation bilaterally, no wheezes, no rales, no increased work of breathing  Cardiovascular: JVP normal, regular rate, regular rhythm, no murmur appreciated, no lower extremity edema    Data reviewed:  Lab Results   Component Value Date    WBC 7.0 04/04/2024    RBC 4.28 04/04/2024    HGB 12.8 04/04/2024    HCT 37.9 04/04/2024    MCV 89 04/04/2024    MCH 29.9 04/04/2024    MCHC 33.8 04/04/2024    RDW 13.2 04/04/2024     04/04/2024     Sodium   Date Value Ref Range Status   04/04/2024 138 135 - 145 mmol/L Final     Comment:     Reference intervals for this test were updated on 09/26/2023 to more accurately reflect our healthy population. There may be differences in the flagging of prior results with similar values performed with this method. Interpretation of those prior results can be made in the context of the updated reference intervals.      Potassium   Date Value Ref Range Status   04/04/2024 4.6 3.4 - 5.3 mmol/L Final     Chloride   Date Value Ref Range Status   04/04/2024 101 98 - 107 mmol/L Final     Carbon Dioxide (CO2)   Date Value Ref Range Status   04/04/2024 24 22 - 29 mmol/L Final     Anion Gap   Date Value Ref Range Status   04/04/2024 13 7 - 15 mmol/L Final     Glucose   Date Value Ref Range Status   04/04/2024 146 (H) 70 - 99 mg/dL Final     GLUCOSE BY METER POCT   Date Value Ref Range Status   04/04/2024 139 (H) 70 - 99 mg/dL " Final     Urea Nitrogen   Date Value Ref Range Status   04/04/2024 15.6 8.0 - 23.0 mg/dL Final     Creatinine   Date Value Ref Range Status   04/04/2024 0.83 0.51 - 0.95 mg/dL Final     GFR Estimate   Date Value Ref Range Status   04/04/2024 70 >60 mL/min/1.73m2 Final     Calcium   Date Value Ref Range Status   04/04/2024 8.9 8.8 - 10.2 mg/dL Final     Bilirubin Total   Date Value Ref Range Status   04/04/2024 0.3 <=1.2 mg/dL Final     Alkaline Phosphatase   Date Value Ref Range Status   04/04/2024 32 (L) 40 - 150 U/L Final     Comment:     Reference intervals for this test were updated on 11/14/2023 to more accurately reflect our healthy population. There may be differences in the flagging of prior results with similar values performed with this method. Interpretation of those prior results can be made in the context of the updated reference intervals.     ALT   Date Value Ref Range Status   09/13/2024 16 0 - 50 U/L Final     AST   Date Value Ref Range Status   04/04/2024 48 (H) 0 - 45 U/L Final     Comment:     Reference intervals for this test were updated on 6/12/2023 to more accurately reflect our healthy population. There may be differences in the flagging of prior results with similar values performed with this method. Interpretation of those prior results can be made in the context of the updated reference intervals.     Recent Labs   Lab Test 09/13/24  0928 06/12/24  0835   CHOL 233* 227*   HDL 67 71   * 133*   TRIG 153* 116        Thank you for allowing me to participate in the care of your patient.      Sincerely,     Johnnie Mason MD     [facility]  cc:   Harriet Petersen MD  [address] [place] [address]

## 2025-07-15 ENCOUNTER — ORDERS ONLY (AUTO-RELEASED) (OUTPATIENT)
Dept: CARDIOLOGY | Facility: CLINIC | Age: 85
End: 2025-07-15
Payer: MEDICARE

## 2025-07-15 DIAGNOSIS — R55 SYNCOPE, UNSPECIFIED SYNCOPE TYPE: ICD-10-CM

## 2025-08-14 ENCOUNTER — TELEPHONE (OUTPATIENT)
Dept: CARDIOLOGY | Facility: CLINIC | Age: 85
End: 2025-08-14
Payer: MEDICARE

## 2025-08-14 DIAGNOSIS — I47.10 SVT (SUPRAVENTRICULAR TACHYCARDIA): Primary | ICD-10-CM

## 2025-08-14 RX ORDER — METOPROLOL SUCCINATE 25 MG/1
TABLET, EXTENDED RELEASE ORAL
Qty: 45 TABLET | Refills: 3 | Status: SHIPPED | OUTPATIENT
Start: 2025-08-14

## 2025-09-02 ENCOUNTER — TELEPHONE (OUTPATIENT)
Dept: CARDIOLOGY | Facility: CLINIC | Age: 85
End: 2025-09-02
Payer: MEDICARE

## (undated) RX ORDER — REGADENOSON 0.08 MG/ML
INJECTION, SOLUTION INTRAVENOUS
Status: DISPENSED
Start: 2024-01-04